# Patient Record
Sex: FEMALE | Race: WHITE | NOT HISPANIC OR LATINO | URBAN - METROPOLITAN AREA
[De-identification: names, ages, dates, MRNs, and addresses within clinical notes are randomized per-mention and may not be internally consistent; named-entity substitution may affect disease eponyms.]

---

## 2017-07-18 ENCOUNTER — TELEPHONE (OUTPATIENT)
Dept: OTHER | Facility: OUTPATIENT CENTER | Age: 52
End: 2017-07-18

## 2017-07-18 NOTE — TELEPHONE ENCOUNTER
Telephone Intake  Date: 2017  Client Name:  Jackie Kaplan  Preferred Name:    MRN: [id number]  : 1965       Age:  52  Caller Name: Self  Relationship to Client:      Presenting Problem / Reason for Assessment   (Clinical History &Symptoms):                 Menopausal Issues - Low Labido - Vaginal Dryness/Thinning      Length of time experiencing symptoms:  ?      Seen Other Providers for Gender (if so, where):    M.D/other.(hormones) :    [facility]                                              Dr. Skelton - Has tried vaginal creams in the past and found that they give her yeast infections  --If yes, request records from the provider at the 1st session.    Therapist:  Claudy Vega  --if yes, request a referral or summary letter from the therapist at the 1st session..    Psychiatrist:  GADIEL  --if yes,, request records from the provider at the 1st session..      Other Medical/Mental Health Diagnoses:                       Menopause                     Herpes                     Hypothyriod          Medications:  Levothyroxine                             Lorazepam           Primary Care Provider: Dr. Skelton                    --If multiple medical conditions, request recent records from primary doctor at the 1st session..      Referral Source:  Joan Vega      Follow Up:        Please Verify Registration    If patient has [employer] or [employer], send to .     Prep Welcome Packet and have patient come half hour beforehand to fill out forms in packet (health history form, transgender history, Safety Screening, PHQ9, and MIKE-7.                    Paperwork Sent                                                                                                                                                                                                                                       Appt

## 2017-07-27 ENCOUNTER — OFFICE VISIT (OUTPATIENT)
Dept: OTHER | Facility: OUTPATIENT CENTER | Age: 52
End: 2017-07-27

## 2017-07-27 DIAGNOSIS — F12.90 MARIJUANA USE: ICD-10-CM

## 2017-07-27 DIAGNOSIS — N94.10 DYSPAREUNIA, FEMALE: Primary | ICD-10-CM

## 2017-07-27 DIAGNOSIS — F41.1 GENERALIZED ANXIETY DISORDER: ICD-10-CM

## 2017-07-27 DIAGNOSIS — B00.9 HSV (HERPES SIMPLEX VIRUS) INFECTION: ICD-10-CM

## 2017-07-27 ASSESSMENT — ANXIETY QUESTIONNAIRES
7. FEELING AFRAID AS IF SOMETHING AWFUL MIGHT HAPPEN: NEARLY EVERY DAY
6. BECOMING EASILY ANNOYED OR IRRITABLE: NEARLY EVERY DAY
3. WORRYING TOO MUCH ABOUT DIFFERENT THINGS: NOT AT ALL
5. BEING SO RESTLESS THAT IT IS HARD TO SIT STILL: SEVERAL DAYS
GAD7 TOTAL SCORE: 14
1. FEELING NERVOUS, ANXIOUS, OR ON EDGE: MORE THAN HALF THE DAYS
2. NOT BEING ABLE TO STOP OR CONTROL WORRYING: NEARLY EVERY DAY

## 2017-07-27 ASSESSMENT — PATIENT HEALTH QUESTIONNAIRE - PHQ9: 5. POOR APPETITE OR OVEREATING: MORE THAN HALF THE DAYS

## 2017-07-27 NOTE — MR AVS SNAPSHOT
After Visit Summary   2017    Jackie Kaplan    MRN: [id number]           Patient Information     Date Of Birth          1965        Visit Information        Provider Department      2017 3:00 PM Jerry Gaspar MD [facility]        Today's Diagnoses     Dyspareunia, female    -  1    HSV (herpes simplex virus) infection        Generalized anxiety disorder        Marijuana use           Follow-ups after your visit        Follow-up notes from your care team     Return if symptoms worsen or fail to improve.      Who to contact     Please call your clinic at [phone] to:    Ask questions about your health    Make or cancel appointments    Discuss your medicines    Learn about your test results    Speak to your doctor   If you have compliments or concerns about an experience at your clinic, or if you wish to file a complaint, please contact [facility] Patient Relations at [phone] or email us at [email or web address]         Additional Information About Your Visit        MyChart Information     [employer] is an electronic gateway that provides easy, online access to your medical records. With [employer], you can request a clinic appointment, read your test results, renew a prescription or communicate with your care team.     To sign up for [employer] visit the website at [email or web address]   You will be asked to enter the access code listed below, as well as some personal information. Please follow the directions to create your username and password.     Your access code is: [email or web address]  Expires: 2017  3:05 PM     Your access code will  in 90 days. If you need help or a new code, please contact your [facility] or call [phone] for assistance.        Care EveryWhere ID     This is your Care EveryWhere ID. This could be used by other organizations to access your [facility]  records  [id number]         Blood Pressure from Last 3 Encounters:   No data found for BP    Weight from Last 3 Encounters:   No data found for Wt              Today, you had the following     No orders found for display       Primary Care Provider    None Specified       No primary provider on file.        Equal Access to Services     RUBEN RUIZ : Hadtalisha gianni ramirez andryo Sojose mali, wagiuliada luqadaha, qajohannta kaalmada adeluis, elizabeth aracelisin hayaadaniel yijass jordan ronal . So [facility] [phone].    ATENCIÓN: Si habla español, tiene a mcdaniel disposición servicios gratuitos de asistencia lingüística. Llame al [phone].    We comply with applicable federal civil rights laws and [place] laws. We do not discriminate on the basis of race, color, national origin, age, disability sex, sexual orientation or gender identity.            Thank you!     Thank you for choosing [facility]  for your care. Our goal is always to provide you with excellent care. Hearing back from our patients is one way we can continue to improve our services. Please take a few minutes to complete the written survey that you may receive in the mail after your visit with us. Thank you!             Your Updated Medication List - Protect others around you: Learn how to safely use, store and throw away your medicines at www.disposemymeds.org.      Notice  As of 7/27/2017 11:59 PM    You have not been prescribed any medications.

## 2017-07-28 ASSESSMENT — ANXIETY QUESTIONNAIRES: GAD7 TOTAL SCORE: 14

## 2017-07-28 ASSESSMENT — PATIENT HEALTH QUESTIONNAIRE - PHQ9: SUM OF ALL RESPONSES TO PHQ QUESTIONS 1-9: 10

## 2017-08-07 NOTE — PROGRESS NOTES
Sexual Medicine consultation at the request of Dr. Claudy Vega from [facility].      ID:  A 52-year-old cis gender woman.      CHIEF CONCERN:  Dyspareunia x2 years and herpes x4 years.      HISTORY OF PRESENT ILLNESS:  The patient had no sexual concerns 2 years ago, until she discovered that her  was unfaithful to her during their marriage.  He was diagnosed with bipolar and some form of schizoaffective or schizophrenic disorder.  They have not had a healthy sexual relationship for over 10 years.  She does not know when exactly her pain with sexual activity started.  In addition, she had a hysterectomy for fibroids in 2012. One ovary was left. Prior to 2012 her menstrual periods were heavy but regular.  Her hot flashes and night sweats started approximately 4 years ago.  Her last pelvic exam was 4 years ago and was not painful.  She denies any significant vaginal dryness related to sex.  She very rarely masturbates.      For the last 10 years, she has only been having sexual activity with her  approximately once a year.  She did not discover any painful sexual activity until about 2 years ago. She and her  engage in mainly penile vaginal intercourse. When the financial market collapsed, they lost everything, and that is when she found out he was cheating on her due to her outbreak of herpes. They began seeing a therapist and he had told her that he had stopped cheating, but then she found out that he still was engaging in that behavior. At the same time she also had a change in her thyroid medication.     At the onset of symptoms 2 years ago, she felt that their foreplay was adequate.  She had adequate interest and arousal.  No pain with contact with the vulva.  However, at the moment of penetration she would have pain. She describes as burning pins and needles and severe in nature. This continued with full penetration until her  reach orgasm.  They did not use any lubricants. Pain  was worse with thrusting activity, and she noticed some spotting of blood on tissue afterwards. Pain resolved quickly after intercourse stopped. Several months later they were sexual again with intercourse; she experienced the same symptoms. This time they did use lubricant, mainly [place], without benefit.  They have not tried any change in sexual positions.  She saw her primary care provider, who did an exam; found atrophic vaginitis and pt was prescribed an unknown dose of Premarin cream.  She does not remember how often she took it or used it, but only tried for 2 weeks.  She then described having burning discharge; does not remember if she went into a physician for a diagnosis and does not remember how it was treated, although she recalls it being a yeast infection.  She then stopped going on for any further intervention because she no longer wanted to have sex with her  anyway.     She is presenting here because they are trying to rescue their relationship and she thinks that maybe she might want to have sex again with him some someday, but they are currently no longer sexually active with each other.  She denies any vulvar pain with sitting, or wearing tight clothing.  She does not engage in any other penetrative activities.  She does not engage in any oral sex.      She describes having intact libido with sexual thoughts, feelings and fantasies.  She describes having subjective arousal, a sense of pleasure with sexual activity. She has fair nongenital objective arousal.  She is able to get some genital arousal objectively in terms of genital congestion and some lubrication, although it takes significantly longer.  She is able to reach orgasm, although it takes longer.  She denies any pain with non-internal masturbation.  She continues to have hot flashes and night sweats, worse at night, and slowly improving over time.       Current medical problems include:  1. Probable herpes    She describes  "approximately 4 years ago having episode of vulvar pain and blister-like lesions on her vulva, which she went and showed her .  Her  then told her that he had herpes.  The patient never went to the doctor for confirmation or treatment.  She simply went to bed and did not leave for many weeks.  She then thinks she has outbreaks \"several times a month,\" but is not sure-again, she has never received any diagnosis or treatment for this.      2. Hypothyroidism; last TSH was normal.    3. Dyspepsia:  4; Anxiety   She is unable to gain weight due to early satiety and epigastric pain.  She has ongoing anxiety and stress that she treats by smoking marijuana before every meal.  She also treats her anxiety  this way.      MEDICATIONS:  Levothyroxine; Ativan 0.05 mg 3 times a month, at most.      PAST MEDICAL HISTORY:  Hysterectomy for fibroids as noted above.  History of Bell's palsy, history of shingles.  No other hospitalizations or surgeries. Pt does note that she has been medications for anxiety in the past.  She states that Celexa made her homicidal in the past.  She was on Wellbutrin briefly trying to quit smoking but felt more anxious on this.      FAMILY HISTORY:  Mother with obesity, diabetes, cardiac arrhythmia.  Father  of heart disease,  Grandfather with suicide.  Remainder of family history is unremarkable.        SOCIAL HISTORY:  Pt eats a standard diet with a fair amount of dairy.  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  She smoked 1 pack per day from the age of 11 up until 3 years ago-now vapes tobacco regularly.  No other recreational drug use, except marijuana as noted above.  Alcohol twice a year, 1-2 per sitting.  , has adult children.      SEXUAL HISTORY:  4 Partners in the lifetime, all male. History of gonorrhea warts and herpes.  She is unsure whether she has been tested for HIV or hepatitis C.  Is vaccinated for hepatitis B, according to the patient.  Paps have all been normal " in the past, not currently sexually active with a partner.      ROS:  Notable for anxiety, night blindness, trouble swallowing, early satiety.  Weight is now stable.  PHQ-9 is rated at 10 and MIKE score is 14.      PHYSICAL EXAMINATION:   GENERAL:  The patient is alert and in no apparent distress.  ht 5' 4-3/4 inches, weight 119.8, blood pressure 114/66, pulse 67.  Speech regular rate and rhythm, mood appears depressed and quite anxious.  She is tearful on and off during the interview.  Some mild psychomotor agitation.  Thought processes appropriate.  Pupils equal and reactive to light.   HEART:  S1S2, no murmurs.   LUNGS:  Clear to auscultation bilaterally. genital exam:  hair in the normal female distribution. external genitalia were consistent with normal female anatomy.  There are minimal atrophic changes to the vulva with mild thinning of the labia majora and minora; some clear secretions at the vaginal introitus, with some mild loss of rugae and mild paleness to the distal vaginal tissues Q-tip test is negative.  Pt is able to tolerate a 2 finger bimanual exam, but pressure with the exam reproduces her pain with penetration anteriorly with some stretching sensation during penetration.      ASSESSMENT:   1.  Dyspareunia. Discussed the multifactorial nature of patient's dyspareunia, including elements of atrophic vaginitis and vaginismus, and overall lack of penetrative activity in the context of a highly dysfunctional emotional and sexual relationship.  Pt has had very little penetrative sexual activity with partner over the course of 10 years and is in a difficult emotional relationship. It is particularly unclear whether patient actually desires sexual activity with her partner at this time. Would recommend and discussed with pt that it would be difficult to tell if any interventions would be working if she is not in a position to either  them using masturbation or other penetration techniques, or with a  supportive partner.  Recommend continuing therapy and self-exploration to discuss whether interventions at this time would be beneficial. Discussed the options for these interventions including topical estrogen therapy and pelvic PT when she and her partner are having a sexual relationship.     2.  Probable herpes. Counseled pt regarding the nature and treatment of herpes infection, including treatment options such as continuous antiretroviral suppression versus intermittent treatment.     3.  Anxiety disorder.  Discussed treatment options including therapy only versus therapy plus medications. Options given the patient's ongoing dyspepsia and difficulties with weight include Remeron and BuSpar. Other options which might be less appropriate for the patient include Prozac and Effexor.  The patient to discuss these with her therapist as well as consider them in the future.  Strongly discouraged the patient from using marijuana as her main treatment option.     Pt to return as needed       A total of 60 min was spent with the patient, of which greater than 50% was spent counseling regarding the issues documented above

## 2018-05-14 ENCOUNTER — RECORDS - HEALTHEAST (OUTPATIENT)
Dept: LAB | Facility: CLINIC | Age: 53
End: 2018-05-14

## 2018-05-14 LAB
ALBUMIN SERPL-MCNC: 3.9 G/DL (ref 3.5–5)
ALP SERPL-CCNC: 68 U/L (ref 45–120)
ALT SERPL W P-5'-P-CCNC: 15 U/L (ref 0–45)
ANION GAP SERPL CALCULATED.3IONS-SCNC: 9 MMOL/L (ref 5–18)
AST SERPL W P-5'-P-CCNC: 24 U/L (ref 0–40)
BILIRUB SERPL-MCNC: 0.2 MG/DL (ref 0–1)
BUN SERPL-MCNC: 14 MG/DL (ref 8–22)
CALCIUM SERPL-MCNC: 9.7 MG/DL (ref 8.5–10.5)
CHLORIDE BLD-SCNC: 103 MMOL/L (ref 98–107)
CO2 SERPL-SCNC: 27 MMOL/L (ref 22–31)
CREAT SERPL-MCNC: 0.75 MG/DL (ref 0.6–1.1)
GFR SERPL CREATININE-BSD FRML MDRD: >60 ML/MIN/1.73M2
GLUCOSE BLD-MCNC: 85 MG/DL (ref 70–125)
POTASSIUM BLD-SCNC: 4.3 MMOL/L (ref 3.5–5)
PROT SERPL-MCNC: 6.6 G/DL (ref 6–8)
SODIUM SERPL-SCNC: 139 MMOL/L (ref 136–145)
TSH SERPL DL<=0.005 MIU/L-ACNC: 2.47 UIU/ML (ref 0.3–5)

## 2018-11-27 ENCOUNTER — RECORDS - HEALTHEAST (OUTPATIENT)
Dept: LAB | Facility: CLINIC | Age: 53
End: 2018-11-27

## 2018-11-27 LAB
ALBUMIN SERPL-MCNC: 3.8 G/DL (ref 3.5–5)
ALP SERPL-CCNC: 58 U/L (ref 45–120)
ALT SERPL W P-5'-P-CCNC: 10 U/L (ref 0–45)
AMYLASE SERPL-CCNC: 74 U/L (ref 5–120)
ANION GAP SERPL CALCULATED.3IONS-SCNC: 6 MMOL/L (ref 5–18)
AST SERPL W P-5'-P-CCNC: 20 U/L (ref 0–40)
BILIRUB SERPL-MCNC: 0.3 MG/DL (ref 0–1)
BUN SERPL-MCNC: 19 MG/DL (ref 8–22)
CALCIUM SERPL-MCNC: 9.5 MG/DL (ref 8.5–10.5)
CHLORIDE BLD-SCNC: 107 MMOL/L (ref 98–107)
CO2 SERPL-SCNC: 30 MMOL/L (ref 22–31)
CREAT SERPL-MCNC: 0.85 MG/DL (ref 0.6–1.1)
GFR SERPL CREATININE-BSD FRML MDRD: >60 ML/MIN/1.73M2
GLUCOSE BLD-MCNC: 100 MG/DL (ref 70–125)
LIPASE SERPL-CCNC: 21 U/L (ref 0–52)
POTASSIUM BLD-SCNC: 4.1 MMOL/L (ref 3.5–5)
PROT SERPL-MCNC: 6.1 G/DL (ref 6–8)
SODIUM SERPL-SCNC: 143 MMOL/L (ref 136–145)

## 2020-05-11 ENCOUNTER — RECORDS - HEALTHEAST (OUTPATIENT)
Dept: LAB | Facility: CLINIC | Age: 55
End: 2020-05-11

## 2020-05-11 LAB — TSH SERPL DL<=0.005 MIU/L-ACNC: 3.37 UIU/ML (ref 0.3–5)

## 2021-05-24 ENCOUNTER — RECORDS - HEALTHEAST (OUTPATIENT)
Dept: ADMINISTRATIVE | Facility: CLINIC | Age: 56
End: 2021-05-24

## 2021-05-28 ENCOUNTER — RECORDS - HEALTHEAST (OUTPATIENT)
Dept: LAB | Facility: CLINIC | Age: 56
End: 2021-05-28

## 2021-05-28 LAB
CHOLEST SERPL-MCNC: 283 MG/DL
FASTING STATUS PATIENT QL REPORTED: ABNORMAL
HCV AB SERPL QL IA: NEGATIVE
HDLC SERPL-MCNC: 65 MG/DL
LDLC SERPL CALC-MCNC: 194 MG/DL
TRIGL SERPL-MCNC: 121 MG/DL
TSH SERPL DL<=0.005 MIU/L-ACNC: 1.84 UIU/ML (ref 0.3–5)

## 2022-05-16 ENCOUNTER — LAB REQUISITION (OUTPATIENT)
Dept: LAB | Facility: CLINIC | Age: 57
End: 2022-05-16
Payer: COMMERCIAL

## 2022-05-16 DIAGNOSIS — E03.9 HYPOTHYROIDISM, UNSPECIFIED: ICD-10-CM

## 2022-05-16 LAB — TSH SERPL DL<=0.005 MIU/L-ACNC: 1.03 UIU/ML (ref 0.3–5)

## 2022-05-16 PROCEDURE — 84443 ASSAY THYROID STIM HORMONE: CPT | Mod: ORL | Performed by: FAMILY MEDICINE

## 2023-05-23 ENCOUNTER — LAB REQUISITION (OUTPATIENT)
Dept: LAB | Facility: CLINIC | Age: 58
End: 2023-05-23
Payer: COMMERCIAL

## 2023-05-23 DIAGNOSIS — N95.1 MENOPAUSAL AND FEMALE CLIMACTERIC STATES: ICD-10-CM

## 2023-05-23 DIAGNOSIS — E03.9 HYPOTHYROIDISM, UNSPECIFIED: ICD-10-CM

## 2023-05-23 DIAGNOSIS — Z13.220 ENCOUNTER FOR SCREENING FOR LIPOID DISORDERS: ICD-10-CM

## 2023-05-23 LAB
ANION GAP SERPL CALCULATED.3IONS-SCNC: 12 MMOL/L (ref 7–15)
BUN SERPL-MCNC: 17.5 MG/DL (ref 8–23)
CALCIUM SERPL-MCNC: 8.4 MG/DL (ref 8.6–10)
CHLORIDE SERPL-SCNC: 103 MMOL/L (ref 98–107)
CHOLEST SERPL-MCNC: 209 MG/DL
CREAT SERPL-MCNC: 0.9 MG/DL (ref 0.51–0.95)
DEPRECATED HCO3 PLAS-SCNC: 24 MMOL/L (ref 22–29)
GFR SERPL CREATININE-BSD FRML MDRD: 73 ML/MIN/1.73M2
GLUCOSE SERPL-MCNC: 124 MG/DL (ref 70–99)
HDLC SERPL-MCNC: 70 MG/DL
LDLC SERPL CALC-MCNC: 125 MG/DL
NONHDLC SERPL-MCNC: 139 MG/DL
POTASSIUM SERPL-SCNC: 4.4 MMOL/L (ref 3.4–5.3)
SODIUM SERPL-SCNC: 139 MMOL/L (ref 136–145)
TRIGL SERPL-MCNC: 69 MG/DL
TSH SERPL DL<=0.005 MIU/L-ACNC: 0.06 UIU/ML (ref 0.3–4.2)

## 2023-05-23 PROCEDURE — 84443 ASSAY THYROID STIM HORMONE: CPT | Mod: ORL | Performed by: PHYSICIAN ASSISTANT

## 2023-05-23 PROCEDURE — 80061 LIPID PANEL: CPT | Mod: ORL | Performed by: PHYSICIAN ASSISTANT

## 2023-05-23 PROCEDURE — 80048 BASIC METABOLIC PNL TOTAL CA: CPT | Mod: ORL | Performed by: PHYSICIAN ASSISTANT

## 2023-07-26 ENCOUNTER — LAB REQUISITION (OUTPATIENT)
Dept: LAB | Facility: CLINIC | Age: 58
End: 2023-07-26
Payer: COMMERCIAL

## 2023-07-26 DIAGNOSIS — E03.9 HYPOTHYROIDISM, UNSPECIFIED: ICD-10-CM

## 2023-07-26 LAB
T4 FREE SERPL-MCNC: 1.54 NG/DL (ref 0.9–1.7)
TSH SERPL DL<=0.005 MIU/L-ACNC: 0.25 UIU/ML (ref 0.3–4.2)

## 2023-07-26 PROCEDURE — 84439 ASSAY OF FREE THYROXINE: CPT | Mod: ORL | Performed by: PHYSICIAN ASSISTANT

## 2023-07-26 PROCEDURE — 84443 ASSAY THYROID STIM HORMONE: CPT | Mod: ORL | Performed by: PHYSICIAN ASSISTANT

## 2023-10-10 ENCOUNTER — TELEPHONE (OUTPATIENT)
Dept: BEHAVIORAL HEALTH | Facility: CLINIC | Age: 58
End: 2023-10-10
Payer: COMMERCIAL

## 2023-10-10 NOTE — TELEPHONE ENCOUNTER
"Pt is a(n) adult (18+ out of HS) Seeking as eval for Adult Mental Health DA for evaluation and recommendations..  Appointment scheduled by:  Other  (no cost estimate)  Caller name:  Misbah(spouse)    Caller phone #: [phone]  Legal Guardianship Reviewed?  Yes: spouse would not give contact info for client. States he [place] a [place] [employer] Directive. I trans client to [place].   [facility] Notified?  Yes  Brief reason for appt:  client discharged from [facility] 10/08/23   Per spouse \"she had a break down\" he did not elaborate further.   We do not have heidy on file spouse transferred to [facility] re: [place] [employer] [place].      needed?  NO    Contact information verified/updated: Yes    Turner Pantoja   "

## 2023-10-17 ENCOUNTER — HOSPITAL ENCOUNTER (OUTPATIENT)
Dept: BEHAVIORAL HEALTH | Facility: CLINIC | Age: 58
Discharge: HOME | End: 2023-10-17
Attending: FAMILY MEDICINE | Admitting: FAMILY MEDICINE
Payer: COMMERCIAL

## 2023-10-17 PROCEDURE — 90791 PSYCH DIAGNOSTIC EVALUATION: CPT | Performed by: COUNSELOR

## 2023-10-17 RX ORDER — LEVOTHYROXINE SODIUM 75 UG/1
75 TABLET ORAL DAILY
COMMUNITY

## 2023-10-17 RX ORDER — OLANZAPINE 10 MG/1
10 TABLET ORAL AT BEDTIME
COMMUNITY

## 2023-10-17 ASSESSMENT — COLUMBIA-SUICIDE SEVERITY RATING SCALE - C-SSRS
1. IN THE PAST MONTH, HAVE YOU WISHED YOU WERE DEAD OR WISHED YOU COULD GO TO SLEEP AND NOT WAKE UP?: YES
6. HAVE YOU EVER DONE ANYTHING, STARTED TO DO ANYTHING, OR PREPARED TO DO ANYTHING TO END YOUR LIFE?: NO
1. IN THE PAST MONTH, HAVE YOU WISHED YOU WERE DEAD OR WISHED YOU COULD GO TO SLEEP AND NOT WAKE UP?: YES
2. HAVE YOU ACTUALLY HAD ANY THOUGHTS OF KILLING YOURSELF IN THE PAST MONTH?: NO

## 2023-10-17 ASSESSMENT — ANXIETY QUESTIONNAIRES
7. FEELING AFRAID AS IF SOMETHING AWFUL MIGHT HAPPEN: NOT AT ALL
IF YOU CHECKED OFF ANY PROBLEMS ON THIS QUESTIONNAIRE, HOW DIFFICULT HAVE THESE PROBLEMS MADE IT FOR YOU TO DO YOUR WORK, TAKE CARE OF THINGS AT HOME, OR GET ALONG WITH OTHER PEOPLE: VERY DIFFICULT
3. WORRYING TOO MUCH ABOUT DIFFERENT THINGS: NEARLY EVERY DAY
GAD7 TOTAL SCORE: 7
4. TROUBLE RELAXING: SEVERAL DAYS
GAD7 TOTAL SCORE: 7
6. BECOMING EASILY ANNOYED OR IRRITABLE: NOT AT ALL
5. BEING SO RESTLESS THAT IT IS HARD TO SIT STILL: SEVERAL DAYS
2. NOT BEING ABLE TO STOP OR CONTROL WORRYING: NOT AT ALL
1. FEELING NERVOUS, ANXIOUS, OR ON EDGE: MORE THAN HALF THE DAYS

## 2023-10-17 ASSESSMENT — PAIN SCALES - GENERAL: PAINLEVEL: NO PAIN (0)

## 2023-10-17 ASSESSMENT — PATIENT HEALTH QUESTIONNAIRE - PHQ9: SUM OF ALL RESPONSES TO PHQ QUESTIONS 1-9: 14

## 2023-10-17 NOTE — PROGRESS NOTES
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 well-being. Provider gave patient printed information about the effects of chemical use on her health and well being. Recommendations:  none .     8. Records:   These were reviewed at time of assessment.   Information in this assessment was obtained from the medical record and  provided by patient and   who is a fair historian.    Patient will have open access to their mental health medical record.    9.   Interactive Complexity: No    10. Safety Plan:  When the patient identifies the following:  Wish to die    The following is recommended:   Complete/Review/Update Safety Plan    Safety Plan:  Adult Long Safety Plan:     [facility]                                       Jackie Kaplan     SAFETY PLAN:  Step 1: Warning signs / cues (Thoughts, images, mood, situation, behavior) that a crisis may be developing:  Thoughts: \"I just want this to end\"  Images: obsessive thoughts of death or dying: ideation  Thinking Processes: ruminations (can't stop thinking about my problems):    Mood: worsening depression and intense worry  Behaviors: " "isolating/withdrawing  and not sleeping enough  Situations: changes in symptoms: anxiety and psychosis    Step 2: Coping strategies - Things I can do to take my mind off of my problems without contacting another person (relaxation technique, physical activity):  Distress Tolerance Strategies:  arts and crafts: coloring, listen to positive and upbeat music: play music , and dance  Physical Activities: go for a walk  Focus on helpful thoughts:  \"I will get through this\" and \"It always passes\"  Step 3: People and social settings that provide distraction:   Name: , friends, cousin, therapist Phone: GADIEL     coffee shop, park, community center, and work   Step 4: Remind myself of people and things that are important to me and worth living for:  my kid      Step 5: When I am in crisis, I can ask these people to help me use my safety plan:   Name: Merlin ()  Phone: GADIEL     Step 6: Making the environment safe:   be around others  Step 7: Professionals or agencies I can contact during a crisis:  Suicide Prevention Lifeline: Call or Text 618   Local Crisis Services: [facility] Mobile Crisis  [phone] (adults)  [phone] (children)      Call 911 or go to my nearest emergency department.   I helped develop this safety plan and agree to use it when needed.  I have been given a copy of this plan.      Client signature _________________________________________________________________  Today s date:  10/17/2023  Completed by Provider Name/ Credentials:  YARED Davison/SERENA  October 17, 2023  Adapted from Safety Plan Template 2008 Rosalie Vargas and Turner العراقي is reprinted with the express permission of the authors.  No portion of the Safety Plan Template may be reproduced without the express, written permission.  You can contact the authors at [email or web address] or [email or web address].        Provider Name/ Credentials:  YARED Davison,  SERENA  Dual   Phone: [phone]  Fax: " [phone]     October 17, 2023

## 2023-10-17 NOTE — PROGRESS NOTES
LOCUS Worksheet     Name: Jackie Kaplan MRN: [id number]    : 1965      Gender:  female    PMI:  NA   Provider Name: [facility]    Provider NPI:  [id number]    Actual level of Care Provided:  therapy and psychiatry    Service(s) receiving or referred to:  IOP    Reason for Variance: due to worsening mental health symptoms.      Rating completed by: Francisco Morrissey, ERIKC/CADEC      I. Risk of Harm:   2      Low Risk of Harm    II. Functional Status:   3      Moderate Impairment    III. Co-Morbidity:   2      Minor Co-Morbidity    IV - A. Recovery Environment - Level of Stress:   3      Moderately Stress Environment    IV - B. Recovery Environment - Level of Support:   2      Supportive Environment    V. Treatment and Recovery History:   3      Moderate to Equivocal Response to Treatment and Recovery Management    VI. Engagement and Recovery Project:   3      Limited Engagement and Recovery       18 Composite Score    Level of Care Recommendation:   17 to 19       High Intensity Community Based Services

## 2023-10-18 ENCOUNTER — TELEPHONE (OUTPATIENT)
Dept: BEHAVIORAL HEALTH | Facility: CLINIC | Age: 58
End: 2023-10-18
Payer: COMMERCIAL

## 2023-10-18 NOTE — TELEPHONE ENCOUNTER
Writer contacted patient's spouse per referral notes, explained that current NICK is for emergency contact and agreed to call him back tomorrow morning when he will be with patient.    Alison Vega [facility] on 10/18/2023 at 2:58 PM

## 2023-10-19 ENCOUNTER — TELEPHONE (OUTPATIENT)
Dept: BEHAVIORAL HEALTH | Facility: CLINIC | Age: 58
End: 2023-10-19
Payer: COMMERCIAL

## 2023-10-19 ENCOUNTER — MYC MEDICAL ADVICE (OUTPATIENT)
Dept: BEHAVIORAL HEALTH | Facility: CLINIC | Age: 58
End: 2023-10-19
Payer: COMMERCIAL

## 2023-10-19 ENCOUNTER — BEH TREATMENT PLAN (OUTPATIENT)
Dept: BEHAVIORAL HEALTH | Facility: CLINIC | Age: 58
End: 2023-10-19
Attending: PSYCHIATRY & NEUROLOGY

## 2023-10-19 NOTE — TELEPHONE ENCOUNTER
----- Message from Alison Vega [facility] sent at 10/19/2023  9:26 AM CDT -----  Regarding: Patient starting IOP/DT 2  Adult Mental Health Programmatic Care Schedule Request    Patient Name: Jackie Kaplan  Location of programming: [facility]  Start Date: 10/24/23      Adult Program Group: Adult Program Group: DT/IOP 2 Psychosis Track [[id number]]  Schedule: M, T, Th 12 noon - 3 pm  9 hours per week for 12 weeks  Number of visits to be scheduled: 36 days    Attending Provider (MD):  Dr Teo Vu.  Visit Type:  In-Person    Accommodations Needed: N/A  Alerts Identified/Substantiation: N/A  Consulted with Supervisor: N/A    Send to:   [UR BEH BCA (07259)] ##ONLY if Start Date is determined##  NG 14 OBC's (BEH BEHAVIORAL OUTPATIENT ASSESSMENTS [35623])   Alison Vega ([facility])  Camila Strong (PHP)  Jessica Horton

## 2023-10-19 NOTE — TELEPHONE ENCOUNTER
Writer, patient and patient's  discussed programming; patient will start in IOP/DT 2 on 10/24.    YARED Callaway on 10/19/2023 at 9:26 AM

## 2023-10-20 NOTE — ADDENDUM NOTE
Encounter addended by: Francisco Morrissey, [facility] on: 10/20/2023 3:06 PM   Actions taken: Clinical Note Signed

## 2023-10-23 NOTE — PROGRESS NOTES
"Safety Plan:  Adult Long Safety Plan:      [facility]                                        Jackierosario Kaplan         SAFETY PLAN:  Step 1: Warning signs / cues (Thoughts, images, mood, situation, behavior) that a crisis may be developing:  Thoughts: \"I just want this to end\"  Images: obsessive thoughts of death or dying: ideation  Thinking Processes: ruminations (can't stop thinking about my problems):    Mood: worsening depression and intense worry  Behaviors: isolating/withdrawing  and not sleeping enough  Situations: changes in symptoms: anxiety and psychosis    Step 2: Coping strategies - Things I can do to take my mind off of my problems without contacting another person (relaxation technique, physical activity):  Distress Tolerance Strategies:  arts and crafts: coloring, listen to positive and upbeat music: play music , and dance  Physical Activities: go for a walk  Focus on helpful thoughts:  \"I will get through this\" and \"It always passes\"  Step 3: People and social settings that provide distraction:                 Name: , friends, cousin, therapist            Phone: NA                   coffee shop, park, community center, and work           Step 4: Remind myself of people and things that are important to me and worth living for:  my kid        Step 5: When I am in crisis, I can ask these people to help me use my safety plan:                 Name: Merlin ()              Phone: NA                   Step 6: Making the environment safe:   be around others  Step 7: Professionals or agencies I can contact during a crisis:  Suicide Prevention Lifeline: Call or Text 781   Local Crisis Services: [facility] Mobile Crisis  [phone] (adults)  [phone] (children)        Call 911 or go to my nearest emergency department.       I helped develop this safety plan and agree to use it when needed.  I have been given a copy of this plan.     "   Client signature _________________________________________________________________  Today s date:  10/17/2023  Completed by Provider Name/ Credentials:  Francisco Morrissey, YARED/SERENA                    October 17, 2023
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
## 2023-10-24 ENCOUNTER — HOSPITAL ENCOUNTER (OUTPATIENT)
Dept: BEHAVIORAL HEALTH | Facility: CLINIC | Age: 58
Discharge: HOME | End: 2023-10-24
Attending: PSYCHIATRY & NEUROLOGY
Payer: COMMERCIAL

## 2023-10-24 VITALS
OXYGEN SATURATION: 97 % | BODY MASS INDEX: 18.78 KG/M2 | RESPIRATION RATE: 17 BRPM | WEIGHT: 110 LBS | DIASTOLIC BLOOD PRESSURE: 52 MMHG | SYSTOLIC BLOOD PRESSURE: 92 MMHG | HEART RATE: 76 BPM | TEMPERATURE: 97.8 F | HEIGHT: 64 IN

## 2023-10-24 PROBLEM — F41.1 GENERALIZED ANXIETY DISORDER: Status: ACTIVE | Noted: 2023-10-24

## 2023-10-24 PROCEDURE — 90853 GROUP PSYCHOTHERAPY: CPT | Performed by: PSYCHOLOGIST

## 2023-10-24 PROCEDURE — 90853 GROUP PSYCHOTHERAPY: CPT | Performed by: SOCIAL WORKER

## 2023-10-24 ASSESSMENT — PATIENT HEALTH QUESTIONNAIRE - PHQ9
SUM OF ALL RESPONSES TO PHQ QUESTIONS 1-9: 12
5. POOR APPETITE OR OVEREATING: SEVERAL DAYS

## 2023-10-24 ASSESSMENT — ANXIETY QUESTIONNAIRES
1. FEELING NERVOUS, ANXIOUS, OR ON EDGE: SEVERAL DAYS
7. FEELING AFRAID AS IF SOMETHING AWFUL MIGHT HAPPEN: SEVERAL DAYS
5. BEING SO RESTLESS THAT IT IS HARD TO SIT STILL: NOT AT ALL
GAD7 TOTAL SCORE: 3
3. WORRYING TOO MUCH ABOUT DIFFERENT THINGS: NOT AT ALL
IF YOU CHECKED OFF ANY PROBLEMS ON THIS QUESTIONNAIRE, HOW DIFFICULT HAVE THESE PROBLEMS MADE IT FOR YOU TO DO YOUR WORK, TAKE CARE OF THINGS AT HOME, OR GET ALONG WITH OTHER PEOPLE: SOMEWHAT DIFFICULT
2. NOT BEING ABLE TO STOP OR CONTROL WORRYING: NOT AT ALL
6. BECOMING EASILY ANNOYED OR IRRITABLE: NOT AT ALL
GAD7 TOTAL SCORE: 3

## 2023-10-24 NOTE — GROUP NOTE
Psychoeducation Group Note    PATIENT'S NAME: Jackie Kaplan  MRN:   [id number]  :   1965  ACCT. NUMBER: [id number]  DATE OF SERVICE: 10/24/23  START TIME:  2:00 PM  END TIME:  2:50 PM  FACILITATOR: Carisa Yan LICSW; Zehra Melo OTR  TOPIC: MH Life Skills Group: Lifestyle Balance and Structure  [facility] Adult Mental Health Day Treatment  TRACK: IOP/DT 2     NUMBER OF PARTICIPANTS: 4    Summary of Group / Topics Discussed:  Lifestyle Balance and Strucure:  Time Management: Motivation - Intrinsic and Extrinsic Motivators: Patients were taught and applied skills and strategies to effectively manage their time, energy, and resources.  Patients discussed motivation as it relates to mental health symptoms and shared individual challenges to engaging in and accomplishing meaningful activities of daily living.  Patients were educated on intrinsic and extrinsic motivation and provided with strategies on how to use these motivators to accomplish activities.     Patient Session Goals / Objectives:  Identified how mental health symptoms impact motivation and can lead to difficulty completing activities of daily living    Identified tasks they are having difficulty engaging in and completing and at least one intrinsic and one extrinsic motivator to try to help improve follow through          Patient Participation / Response:  Fully participated with the group by sharing personal reflections / insights and openly received / provided feedback with other participants.    Verbalized understanding of content    Treatment Plan:  Patient has a current master individualized treatment plan.  See Epic treatment plan for more information.    CIERRA Galarza
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
## 2023-10-24 NOTE — PROGRESS NOTES
"RN Review of Medical History / Physical Health Screen  Outpatient Mental Health Programs - [facility] Adult Mental Health Day Treatment    PATIENT'S NAME: Jackie Kaplan  Preferred name: Jackie   58 year old  MRN:   [id number]  :   1965  ACCT. NUMBER: [id number]  CURRENT AGE:  58 year old    DATE OF DIAGNOSTIC ASSESSMENT: 10/17/2023  DATE OF ADMISSION: 10/24/2023     Please see Diagnostic Assessment for additional Medical History.     General Health:   Have you had any exposure to any communicable disease in the past 2-3 weeks? no     Do you have a history of seizures?     If so, do you have a seizure plan? Known triggers?     Notify patient that we will call 911 (if virtual) or a code (if in-person), if we were to witness seizure during group. no    N/A     Nutrition:    Are you on a special diet? If yes, please explain:  no   Do you have any concerns regarding your nutritional status? If yes, please explain:  no   Have you had any appetite changes in the last 3 months?  No     Have you had any weight loss or weight gain in the last 3 months?  No, but struggles to maintain appetite without using cannabis           Height/Weight Review:  Patient reported height:  5'4\"      Patient reports weight:  Date last checked:  110 lb   10/14/23       Patient height and weight recorded by RN in epic flowsheet: No; Unable to measure  Programmatic Care currently provided via telehealth. All pt weights and heights will be collected through patient self-report and recorded in physical health screening progress note upon admission to the program.        Fall Risk:   Have you had any falls in the past 3 months? no     Do you currently use any assistive devices for mobility?   no      Does the patient have medication concerns? Yes, history of abnormal movement when taking psychiatric medications. Patient was unable to recall the name of the medication that caused the abnormal movements.      Was an MTM " referral placed? No      Does the patient have any acute or chronic pain concerns that might impact participation in the program? No         Per completion of the Medical History / Physical Health Screen, is there a recommendation to see / follow up with a primary care physician/clinic or dentist?    Yes, Recommendations:   nutritional risk          Sandy Campbell RN  10/24/2023

## 2023-10-24 NOTE — GROUP NOTE
Psychotherapy Group Note    PATIENT'S NAME: Jackie Kaplan  MRN:   [id number]  :   1965  ACCT. NUMBER: [id number]  DATE OF SERVICE: 10/24/23  START TIME: 12:00 PM  END TIME: 12:50 PM  FACILITATOR: Alka De La Paz Psy.D, LP  TOPIC: MH EBP Group: Self-Awareness  [facility] Mental [facility] Day Treatment  TRACK: iop/dt 2p   6B    NUMBER OF PARTICIPANTS: 8    Summary of Group / Topics Discussed:  Self-Awareness: Grief: Patients were provided with an overview of how personal losses impact their thoughts, feelings, and behaviors. Different stages of grief were discussed, with a focus on the personal and individual experiences of grief as a natural response to loss. The relationship between grief, depression, and anxiety was also discussed. Patients were provided with information regarding different ways of processing grief and shared their personal experiences.     Patient Session Goals / Objectives:  Defined and explored the concept of grief and the grieving process  Discussed relationship between grief, depression, and anxiety   Normalized and recognized the purpose/benefits of the grieving process  Discussed management of the thoughts and feelings associated with grief      Patient Participation / Response:  Fully participated with the group by sharing personal reflections / insights and openly received / provided feedback with other participants.    Demonstrated understanding of values, strengths, and challenges to learn about themselves    Treatment Plan:  Patient has a current master individualized treatment plan.  See Epic treatment plan for more information.    Alka De La Paz Psy.D, ANGUS

## 2023-10-26 ENCOUNTER — HOSPITAL ENCOUNTER (OUTPATIENT)
Dept: BEHAVIORAL HEALTH | Facility: CLINIC | Age: 58
Discharge: HOME | End: 2023-10-26
Attending: PSYCHIATRY & NEUROLOGY
Payer: COMMERCIAL

## 2023-10-26 PROCEDURE — 90853 GROUP PSYCHOTHERAPY: CPT | Performed by: PSYCHOLOGIST

## 2023-10-26 PROCEDURE — 90853 GROUP PSYCHOTHERAPY: CPT | Performed by: SOCIAL WORKER

## 2023-10-26 NOTE — GROUP NOTE
Psychotherapy Group Note    PATIENT'S NAME: Jackie Kaplan  MRN:   [id number]  :   1965  ACCT. NUMBER: [id number]  DATE OF SERVICE: 10/26/23  START TIME: 12:00 PM  END TIME: 12:50 PM  FACILITATOR: Alka De La Paz Psy.D, LP  TOPIC: MH EBP Group: Coping Skills  [facility] Adult Mental Health Day Treatment  TRACK: iop/dt2p 6B    NUMBER OF PARTICIPANTS: 6    Summary of Group / Topics Discussed:  Coping Skills: Building Positive Experiences: Patients discussed the importance of planning and engaging in positive experiences, as strategies to increase positive thinking, hope, and self-worth.  Explored the benefits of planning / creating positive experiences, including recognizing and reducing negativity bias by focusing on and building positive experiences.   Several approaches to building positive experiences were presented and discussed relevant to each patient.      Patient Session Goals / Objectives:  Understand the purpose of planning / creating / participating / sharing in positive experiences.  Explore patient s experiences related to negative thinking and how it influences activities and moodIdentify current positive events in patient s life.   Set goals to increase a variety of positive experiences.  Address barriers to planning / engaging in positive experiences.      Patient Participation / Response:  Fully participated with the group by sharing personal reflections / insights and openly received / provided feedback with other participants.    Expressed understanding of the relevance / importance of coping skills at distressing times in life    Treatment Plan:  Patient has a current master individualized treatment plan.  See Epic treatment plan for more information.    Alka De La Paz Psy.D, ANGUS

## 2023-10-26 NOTE — GROUP NOTE
"Process Group Note    PATIENT'S NAME: Jackie Kaplan  MRN:   [id number]  :   1965  ACCT. NUMBER: [id number]  DATE OF SERVICE: 10/26/23  START TIME:  1:00 PM  END TIME:  1:50 PM  FACILITATOR: Alka De La Paz Psy.D, LP  TOPIC:  Process Group    Diagnoses:  300.02 (F41.1) Generalized Anxiety Disorder.  296.32 (F33.1) Major Depressive Disorder, Recurrent Episode, Moderate _ and With anxious distress  309.9 (F43.9) Unspecified Trauma and Stressor Related Disorder.  5. 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       [facility]   PSYCHIATRY DISCHARGE SUMMARY  Admission Date and Time: 10/6/2023 3:11 AM   Discharge Date: 10/8/2023  Discharge Diagnoses   # 1: Bipolar I, manic with psychotic features   # 2: Cannabis use disorder, moderate     Per her report  PTSD                Psychiatry: [facility]        [address]  [place] [address]  Phone: [phone]          Individual Therapist/Name:  [facility] ([facility])  PCP: Mary Carbajal [facility] Day Treatment  TRACK: iop/dt2p  6B    NUMBER OF PARTICIPANTS: 6        Data:    Session content: At the start of this group, patients were invited to check in by identifying themselves, describing their current emotional status, and identifying issues to address in this group.   Area(s) of treatment focus addressed in this session included Symptom Management, Personal Safety, and Community Resources/Discharge Planning.  Client reported being safe today.  Reported mood is \"better.\" Goal for today is to attend group therapy. The client talked to the group about how he thinks a great deal and intellectualizes situations. He reported that he thinks about his message and where conversations will go, so he can put his \"thoughts in other people's minds.\" He reported that " "He wants to hear other people's perspectives and understand them. He stated that he enjoys the groups and meeting other people. He reported being safe, no cd use, or self-injurious behaviors. He reported that he \"had one drop of alcohol,\" otherwise is sober. He talked about being in a safe environment and feeling support from the group.  He talked to the group about hearing special messages.    Therapeutic Interventions/Treatment Strategies:  Psychotherapist reinforced use of skills. Treatment modalities used include Cognitive Behavioral Therapy and Dialectical Behavioral Therapy. Interventions include Coping Skills: Assisted patient in identifying 1-2 healthy distraction skills to reduce overall distress, Discussed how the use of intentional \"in the moment\" actions can help reduce distress, and Reviewed patients current calming practices and discussed a more formal way of practicing and accessing skills, Relapse Prevention: Facilitated understanding of effective coping skills in response to triggers for substance use, Mindfulness: Facilitated discussion of when/how to use mindfulness skills to benefit general health, mental health symptoms, and stressors, and Symptoms Management: Promoted understanding of their diagnoses and how it impacts their functioning.    Assessment:    Patient response:   Patient responded to session by giving feedback, focusing on goals, and being attentive    Possible barriers to participation / learning include: severity of symptoms    Health Issues:   None reported       Substance Use Review:   Substance Use: Last use: \"one drop of alcohol\"    Mental Status/Behavioral Observations  Appearance:   Appropriate   Eye Contact:   Good   Psychomotor Behavior: Normal   Attitude:   Cooperative   Orientation:   All  Speech   Rate / Production: Normal    Volume:  Normal   Mood:    Anxious  Depressed   Affect:    Constricted   Thought Content:   Rumination and Psychosis reports preservative thoughts " and referential thinking  Thought Form:  Coherent  Logical  Psychosis    Insight:    Good     Plan:   Safety Plan: Recommended that patient call 911 or go to the local ED should there be a change in any of these risk factors.   Barriers to treatment: None identified  Patient Contracts (see media tab):  None  Substance Use: Provided encouragement towards sobriety   Continue or Discharge: Patient will continue in Adult Day Treatment (ADT)  as planned. Patient is likely to benefit from learning and using skills as they work toward the goals identified in their treatment plan.      Jimmy Morocho.MIKIE, LP  October 26, 2023

## 2023-10-26 NOTE — GROUP NOTE
Psychoeducation Group Note    PATIENT'S NAME: Jackie Kaplan  MRN:   [id number]  :   1965  ACCT. NUMBER: [id number]  DATE OF SERVICE: 10/26/23  START TIME:  2:00 PM  END TIME:  2:50 PM  FACILITATOR: Carisa Yan LICSW; Genna Patrick OTR  TOPIC: MH Life Skills Group: Resiliency Development  [facility] Adult Mental Health Day Treatment  TRACK: IOP/DT 2 (6B)    NUMBER OF PARTICIPANTS: 6    Summary of Group / Topics Discussed:  Resiliency Development:  A, B, Cs of Coping Skills: Patients were taught how to identify stressors, signs of stress, coping skills, and prevention strategies for overall stress management.  Patients were given the opportunity to identify both ongoing and acute mental health symptoms and how to effectively manage these symptoms by developing an effective aftercare plan.  Patients increased awareness of community based resources.    Patient Session Goals / Objectives:  Identified how using coping skills can be used for symptom and stress management     Improved awareness of individualed symptoms and stressors and how to effectively cope   Established a relapse prevention plan to practice these skills in their own environments  Practiced and reflected on how to generalize taught skills to their everyday life      Patient Participation / Response:  Fully participated with the group by sharing personal reflections / insights and openly received / provided feedback with other participants.    Verbalized understanding of content    Treatment Plan:  Patient has a current master individualized treatment plan.  See Epic treatment plan for more information.    CIERRA Galarza

## 2023-10-30 ENCOUNTER — HOSPITAL ENCOUNTER (OUTPATIENT)
Dept: BEHAVIORAL HEALTH | Facility: CLINIC | Age: 58
Discharge: HOME | End: 2023-10-30
Attending: PSYCHIATRY & NEUROLOGY
Payer: COMMERCIAL

## 2023-10-30 PROCEDURE — 90853 GROUP PSYCHOTHERAPY: CPT | Performed by: SOCIAL WORKER

## 2023-10-30 PROCEDURE — 90853 GROUP PSYCHOTHERAPY: CPT | Performed by: PSYCHOLOGIST

## 2023-10-30 NOTE — GROUP NOTE
Psychoeducation Group Note    PATIENT'S NAME: Jackie Kaplan  MRN:   [id number]  :   1965  ACCT. NUMBER: [id number]  DATE OF SERVICE: 10/30/23  START TIME:  2:00 PM  END TIME:  2:50 PM  FACILITATOR: Carisa Yan LICSW; Nella Ontiveros RN  TOPIC: MH Wellness Group: Health Maintenance  [facility] Mental [facility] Day Treatment  TRACK: IOP/DT 2 (6B)    NUMBER OF PARTICIPANTS: 6    Summary of Group / Topics Discussed:  Health Maintenance: Eight Dimensions of Wellness: The concept of holistic health through the model of eight dimensions was introduced. Group members participated in identifying behaviors and activities in each of the dimensions of wellness.  The importance of each dimension was reinforced and the concept of balance in life as it relates to wellness was explored.      Patient Session Goals / Objectives:  Verbalized understanding of balance in wellness and how it relates to their life  Identified and explained the eight dimensions of wellness  Categorized activities and wellness needs into corresponding dimensions appropriately during exercise        Patient Participation / Response:  Fully participated with the group by sharing personal reflections / insights and openly received / provided feedback with other participants.    Demonstrated understanding of topics discussed through group discussion and participation and Verbalized understanding of health maintenance topic    Treatment Plan:  Patient has a current master individualized treatment plan.  See Epic treatment plan for more information.    CIERRA Galarza

## 2023-10-30 NOTE — GROUP NOTE
Psychotherapy Group Note    PATIENT'S NAME: Jackie Kaplan  MRN:   [id number]  :   1965  ACCT. NUMBER: [id number]  DATE OF SERVICE: 10/30/23  START TIME: 12:00 PM  END TIME: 12:50 PM  FACILITATOR: Alka De La Paz Psy.D, LP  TOPIC:  EBP Group: Self-Awareness  [facility] Adult Mental Health Day Treatment  TRACK: iop/dt2p  6B    NUMBER OF PARTICIPANTS: 6    Summary of Group / Topics Discussed:  Self-Awareness: Gratitude: Topic focused on assisting patients in identifying key concepts in gratitude. Patients discussed the benefits of practicing gratitude and its impact on mood improvement, mindfulness, and perspective. Patients worked to increase time spent on recognition and appreciation of what is positive and working in their lives. Patients discussed the concepts and benefits of feeling grateful. The goal is to reduce rumination and negative thinking resulting in increased mindfulness and resilience. Patients specifically discussed how they can practice and problem solve barriers to daily gratitude practice.     Patient Session Goals / Objectives:  [place] the concept and benefits of gratitude  Identified ways to practice gratitude in daily life  Problem solved barriers to practicing gratitude      Patient Participation / Response:  Fully participated with the group by sharing personal reflections / insights and openly received / provided feedback with other participants.    Demonstrated understanding of values, strengths, and challenges to learn about themselves    Treatment Plan:  Patient has a current master individualized treatment plan.  See Epic treatment plan for more information.    Alka De La Paz Psy.D, LP

## 2023-10-30 NOTE — GROUP NOTE
"Process Group Note    PATIENT'S NAME: Jackie Kaplan  MRN:   [id number]  :   1965  ACCT. NUMBER: [id number]  DATE OF SERVICE: 10/30/23  START TIME:  1:00 PM  END TIME:  1:50 PM  FACILITATOR: Alka De La Paz Psy.D, LP  TOPIC:  Process Group    Diagnoses:  300.02 (F41.1) Generalized Anxiety Disorder.  296.32 (F33.1) Major Depressive Disorder, Recurrent Episode, Moderate _ and With anxious distress  309.9 (F43.9) Unspecified Trauma and Stressor Related Disorder.  5. 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       Per [facility]   PSYCHIATRY DISCHARGE SUMMARY  Admission Date and Time: 10/6/2023 3:11 AM   Discharge Date: 10/8/2023  Discharge Diagnoses   # 1: Bipolar I, manic with psychotic features   # 2: Cannabis use disorder, moderate     Per her report  PTSD                Psychiatry: [facility]        [address]  [place] [address]  Phone: [phone]          Individual Therapist/Name:  [facility] ([facility])  PCP: Mary Carbaj[facility] Mental [facility] Day Treatment  TRACK: iop/dt2p  6B    NUMBER OF PARTICIPANTS: 6        Data:    Session content: At the start of this group, patients were invited to check in by identifying themselves, describing their current emotional status, and identifying issues to address in this group.   Area(s) of treatment focus addressed in this session included Symptom Management, Personal Safety, and Community Resources/Discharge Planning.  Client reported being safe today.  Reported mood is \"good.\"  Goal for today is to attend group therapy.  The client talked to the group about how she stopped isolating from others and went to her psychiatry appointment. She reported that her goal was to stay grounded and present. She reported problems with anxiety, no psychosis, and that she uses " "marijuana to calm her stomach. She reported that she will try to use gummy marijuana and check to get medical marijuana.       Therapeutic Interventions/Treatment Strategies:  Psychotherapist reinforced use of skills. Treatment modalities used include Cognitive Behavioral Therapy and Dialectical Behavioral Therapy. Interventions include Coping Skills: Discussed how the use of intentional \"in the moment\" actions can help reduce distress, Relapse Prevention: Facilitated understanding of effective coping skills in response to triggers for substance use, Mindfulness: Encouraged a plan to use mindfulness skills in daily life, and Symptoms Management: Promoted understanding of their diagnoses and how it impacts their functioning.    Assessment:    Patient response:   Patient responded to session by listening, focusing on goals, and being attentive    Possible barriers to participation / learning include: severity of symptoms    Health Issues:   None reported       Substance Use Review:   Substance Use: Last use: gummy marijuana for her stomach     Mental Status/Behavioral Observations  Appearance:   Appropriate   Eye Contact:   Good   Psychomotor Behavior: Normal   Attitude:   Cooperative   Orientation:   All  Speech   Rate / Production: Normal    Volume:  Normal   Mood:    Anxious  Depressed   Affect:    Constricted   Thought Content:   Rumination and Psychosis denies any symptoms of psychosis  Thought Form:  Coherent  Logical     Insight:    Good     Plan:   Safety Plan: Recommended that patient call 911 or go to the local ED should there be a change in any of these risk factors.   Barriers to treatment: None identified  Patient Contracts (see media tab):  None  Substance Use: Not addressed in session   Continue or Discharge: Patient will continue in Adult Day Treatment (ADT)  as planned. Patient is likely to benefit from learning and using skills as they work toward the goals identified in their treatment " plan.      Alka De La Paz Psy.D, LP  October 30, 2023

## 2023-10-31 ENCOUNTER — HOSPITAL ENCOUNTER (OUTPATIENT)
Dept: BEHAVIORAL HEALTH | Facility: CLINIC | Age: 58
Discharge: HOME | End: 2023-10-31
Attending: PSYCHIATRY & NEUROLOGY
Payer: COMMERCIAL

## 2023-10-31 PROCEDURE — 90853 GROUP PSYCHOTHERAPY: CPT | Performed by: PSYCHOLOGIST

## 2023-10-31 PROCEDURE — 90853 GROUP PSYCHOTHERAPY: CPT | Performed by: SOCIAL WORKER
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
## 2023-10-31 NOTE — GROUP NOTE
Psychotherapy Group Note    PATIENT'S NAME: Jackie Kaplan  MRN:   [id number]  :   1965  ACCT. NUMBER: [id number]  DATE OF SERVICE: 10/31/23  START TIME:  2:00 PM  END TIME:  2:50 PM  FACILITATOR: Carisa Yan LICSW; Genna Patrick OTR  TOPIC: MH EBP Group: Coping Skills  [facility] Mental Health Day Treatment  TRACK: IOP/DT 2 (6B)    NUMBER OF PARTICIPANTS: 6    Summary of Group / Topics Discussed:  Coping Skills: Grounding: Patients discussed and practiced strategies to increase attachment / presence to the current moment.  Patients identified situations in which using these strategies will help improve emotion regulation sense of calm in the body.  Reviewed the benefits of applying grounding strategies, as well as past / current practices of each member.  Patients identified situations in which using these strategies would reduce stress. They developed the ability to distinguish when these strategies can be useful in their lives for management and stress and psychological well-being.    Patient Session Goals / Objectives:  Understand the purpose of using grounding strategies to reduce stress.  Verbalize understanding of how and when to apply grounding strategies to reduce distress and increase presence in the moment.  Practice using various calming strategies (e.g. 5-4-3-2-1; mental and body awareness).  Choose 1-2 grounding strategies to apply during times of distress.      Patient Participation / Response:  Fully participated with the group by sharing personal reflections / insights and openly received / provided feedback with other participants.    Demonstrated understanding of topics discussed through group discussion and participation and Practiced 2-3 new coping skills in session    Treatment Plan:  Patient has a current master individualized treatment plan.  See Epic treatment plan for more information.    CIERRA Galarza

## 2023-10-31 NOTE — GROUP NOTE
Psychotherapy Group Note    PATIENT'S NAME: Jackie Kaplan  MRN:   [id number]  :   1965  ACCT. NUMBER: [id number]  DATE OF SERVICE: 10/31/23  START TIME: 12:00 PM  END TIME: 12:50 PM  FACILITATOR: Alka De La Paz Psy.D, LP  TOPIC: MH EBP Group: Relationship Skills  [facility] Mental Health Day Treatment  TRACK: iop/dt2p  6B    NUMBER OF PARTICIPANTS: 6    Summary of Group / Topics Discussed:  Relationship Skills: Boundaries: Patients were provided with a general overview of interpersonal boundaries and how lack of boundaries relates to symptoms and functioning. The purpose is to help patients identify boundary issues and gain awareness and skills to work towards healthier interpersonal boundaries. Current awareness of healthy boundary characteristics and barriers to establishing healthy boundaries were discussed.    Patient Session Goals / Objectives:  Familiarized patients with the concept of interpersonal boundaries and their characteristics  Discussed and practiced strategies to promote healthier interpersonal boundaries  Identified boundary issues and identified plan to improve boundaries      Patient Participation / Response:  Fully participated with the group by sharing personal reflections / insights and openly received / provided feedback with other participants.    Demonstrated understanding of relationship skills and communication skills    Treatment Plan:  Patient has a current master individualized treatment plan.  See Epic treatment plan for more information.    Alka De La Paz Psy.D, ANGUS

## 2023-11-06 ENCOUNTER — HOSPITAL ENCOUNTER (OUTPATIENT)
Dept: BEHAVIORAL HEALTH | Facility: CLINIC | Age: 58
Discharge: HOME | End: 2023-11-06
Attending: PSYCHIATRY & NEUROLOGY
Payer: COMMERCIAL

## 2023-11-06 PROCEDURE — 90853 GROUP PSYCHOTHERAPY: CPT | Performed by: SOCIAL WORKER

## 2023-11-06 PROCEDURE — 90853 GROUP PSYCHOTHERAPY: CPT | Performed by: PSYCHOLOGIST
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
## 2023-11-06 NOTE — GROUP NOTE
Psychotherapy Group Note    PATIENT'S NAME: Jackie Kaplan  MRN:   [id number]  :   1965  ACCT. NUMBER: [id number]  DATE OF SERVICE: 23  START TIME: 12:00 PM  END TIME: 12:50 PM  FACILITATOR: Alka De La Paz Psy.D, LP  TOPIC:  EBP Group: Self-Awareness  [facility] Adult Mental Health Day Treatment  TRACK: iop/dt2p   6B    NUMBER OF PARTICIPANTS: 5    Summary of Group / Topics Discussed:  Self-Awareness: Personal Strengths: Topic focused on assisting patients in identifying personal strengths and how they relate to the management of mental health symptoms. Patients discussed the benefits of acknowledging their personal strengths and their impact on mood improvement, mindfulness, and perspective. Patients worked to increase time spent on recognition and appreciation of what is positive and working in their lives. The goal is to reduce rumination and negative thinking resulting in increased mindfulness and resilience. Patients will work to put skills into practice and problem-solve barriers.     Patient Session Goals / Objectives:  Identified personal strengths  Identified barriers to recognition of personal strengths  Verbalized understanding of strategies to increase use of their strengths in management of daily symptoms      Patient Participation / Response:  Fully participated with the group by sharing personal reflections / insights and openly received / provided feedback with other participants.    Demonstrated understanding of values, strengths, and challenges to learn about themselves and Identified / Expressed readiness to act intentionally, increase self-compassion, promote personal growth  The group discussed  emotions and coping with difficult symptoms.  They talked about negative thoughts, anhedonia, and about positive thoughts.  Treatment Plan:  Patient has a current master individualized treatment plan.  See Epic treatment plan for more information.    Alka De La Paz Psy.D, ANGUS

## 2023-11-06 NOTE — GROUP NOTE
Psychoeducation Group Note    PATIENT'S NAME: Jackie Kaplan  MRN:   [id number]  :   1965  ACCT. NUMBER: [id number]  DATE OF SERVICE: 23  START TIME:  2:00 PM  END TIME:  2:50 PM  FACILITATOR: Carisa Yan LICSW; Sandy Campbell, RN; Nella Ontiveros RN  TOPIC:  Wellness Group: Mental Health Maintenance  [facility] Adult Mental Health Day Treatment  TRACK: IOP/DT 2    NUMBER OF PARTICIPANTS: 5    Summary of Group / Topics Discussed:  Mental Health Maintenance:  Coping Bingo: Patients were educated on the importance of balance in meeting our wellness needs. Topic of social/coping was reviewed and patients participated in playing a verbal response style coping BINGO game. In this game, patients were challenged to utilize their understanding of themselves and their coping strategies to respond to the questions on their BINGO cards.    Patient Session Goals / Objectives:  Identified the importance of balance in wellness  Explained the important aspects of socialization/effective coping strategies  Listed ways of improving weak areas in social/coping skills      Patient Participation / Response:  Fully participated with the group by sharing personal reflections / insights and openly received / provided feedback with other participants.    Demonstrated understanding of topics discussed through group discussion and participation and Verbalized understanding of mental health maintenance topic    Treatment Plan:  Patient has a current master individualized treatment plan.  See Epic treatment plan for more information.    CIERRA Galarza

## 2023-11-07 ENCOUNTER — HOSPITAL ENCOUNTER (OUTPATIENT)
Dept: BEHAVIORAL HEALTH | Facility: CLINIC | Age: 58
Discharge: HOME | End: 2023-11-07
Attending: PSYCHIATRY & NEUROLOGY
Payer: COMMERCIAL

## 2023-11-07 PROCEDURE — 90853 GROUP PSYCHOTHERAPY: CPT | Performed by: PSYCHOLOGIST

## 2023-11-07 PROCEDURE — 90853 GROUP PSYCHOTHERAPY: CPT | Performed by: SOCIAL WORKER

## 2023-11-07 NOTE — GROUP NOTE
"Process Group Note    PATIENT'S NAME: Jackie Kaplan  MRN:   [id number]  :   1965  ACCT. NUMBER: [id number]  DATE OF SERVICE: 23  START TIME:  1:00 PM  END TIME:  1:50 PM  FACILITATOR: Alka De La Paz Psy.D, LP  TOPIC:  Process Group    Diagnoses:  300.02 (F41.1) Generalized Anxiety Disorder.  296.32 (F33.1) Major Depressive Disorder, Recurrent Episode, Moderate _ and With anxious distress  309.9 (F43.9) Unspecified Trauma and Stressor Related Disorder.  5. 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       Per [facility]   PSYCHIATRY DISCHARGE SUMMARY  Admission Date and Time: 10/6/2023 3:11 AM   Discharge Date: 10/8/2023  Discharge Diagnoses   # 1: Bipolar I, manic with psychotic features   # 2: Cannabis use disorder, moderate     Per her report  PTSD                Psychiatry: [facility]        [address]  [place] [address]  Phone: [phone]          Individual Therapist/Name:  [facility] ([facility])  PCP: Mary Carbajal [facility] Day Treatment  TRACK: 6B    iop/dt2p    NUMBER OF PARTICIPANTS: 6        Data:    Session content: At the start of this group, patients were invited to check in by identifying themselves, describing their current emotional status, and identifying issues to address in this group.   Area(s) of treatment focus addressed in this session included Symptom Management, Personal Safety, and Community Resources/Discharge Planning.  Client reported being safe today.  Reported mood is \"ok, but waiting for the next shoe to drop.\"   Goal for today is to attend group therapy. The client talked to the group about how she has had so many bad things happen with her mental health and was \"locked up\" on inpatient mental health in [place]. She reported that she is used to things falling " "apart and doesn't expect things to be good for her. She talked about delusions, people wanting to harm her or others as \"terrorists or agents\" and talked about smart phones and the scary news on TV. The group discussed anxiety and fear from watching the news.       Therapeutic Interventions/Treatment Strategies:  Psychotherapist reinforced use of skills. Treatment modalities used include Cognitive Behavioral Therapy and Dialectical Behavioral Therapy. Interventions include Cognitive Restructuring:  Facilitated recognition of the connection between negative thoughts and negative core beliefs, Coping Skills: Assisted patient in identifying 1-2 healthy distraction skills to reduce overall distress, and Relapse Prevention: Assisted patient in identifying personal vulnerabilities, thoughts, emotions, and situations that may lead to relapse .    Assessment:    Patient response:   Patient responded to session by focusing on goals, being attentive, and appearing alert    Possible barriers to participation / learning include: severity of symptoms    Health Issues:   None reported       Substance Use Review:   Substance Use: Last use: smokes marijuana daily to get to sleep    Mental Status/Behavioral Observations  Appearance:   Appropriate   Eye Contact:   Good   Psychomotor Behavior: Normal   Attitude:   Cooperative   Orientation:   All  Speech   Rate / Production: Normal    Volume:  Normal   Mood:    Anxious   Affect:    Constricted   Thought Content:   Rumination and Psychosis reports paranoia and delusions  Thought Form:  Coherent  Logical  Psychosis    Insight:    Good     Plan:   Safety Plan: Recommended that patient call 911 or go to the local ED should there be a change in any of these risk factors.   Barriers to treatment: None identified  Patient Contracts (see media tab):  None  Substance Use: Provided encouragement towards sobriety   Continue or Discharge: Patient will continue in Adult Day Treatment (ADT)  as " planned. Patient is likely to benefit from learning and using skills as they work toward the goals identified in their treatment plan.      Alka De La Paz Psy.D, LP  November 7, 2023

## 2023-11-07 NOTE — GROUP NOTE
Psychoeducation Group Note    PATIENT'S NAME: Jackie Kaplan  MRN:   [id number]  :   1965  ACCT. NUMBER: [id number]  DATE OF SERVICE: 23  START TIME:  2:00 PM  END TIME:  2:50 PM  FACILITATOR: Carisa Yan LICSW; Gnena Patrick OTR  TOPIC:  Life Skills Group: Life Skills  [facility] Mental Health Day Treatment  TRACK: IOP/DT 2    NUMBER OF PARTICIPANTS: 6    Summary of Group / Topics Discussed:  Self-Regulation Skills: Coping Skills: Provided education on the benefits of exploring and identifying helpful coping skills to help manage distress and regulate emotions.  Discussed the importance of having a coping skills plan in times of increased symptoms.  Patients were given an opportunity to explore different types of coping skills (distraction, positive/reward, relaxation, mindfulness/grounding, safe ways to express feelings).  Patients self reflected on coping skills that they could engage in their daily life and add into their daily routine.      Patient Session Goals / Objectives:    Review the benefits of having different types of coping skills to help manage distress and regulate emotions    Explore different types of coping skills to promote self-regulation and independent skill use    Established a plan for practice of these skills in their own environments           Patient Participation / Response:  Fully participated with the group by sharing personal reflections / insights and openly received / provided feedback with other participants.    Verbalized understanding of content    Treatment Plan:  Patient has a current master individualized treatment plan.  See Epic treatment plan for more information.    CIERRA Galarza

## 2023-11-07 NOTE — GROUP NOTE
Psychotherapy Group Note    PATIENT'S NAME: Jackie Kaplan  MRN:   [id number]  :   1965  ACCT. NUMBER: [id number]  DATE OF SERVICE: 23  START TIME: 12:00 PM  END TIME: 12:50 PM  FACILITATOR: Alka De La Paz Psy.D, ANGUS  TOPIC:  EBP Group: Emotions Management  [facility] Mental Health Day Treatment  TRACK: iop/dt2p  6B    NUMBER OF PARTICIPANTS: 6    Summary of Group / Topics Discussed:  Emotions Management: Check Facts: Patients participated in an interactive approach to identifying and challenging cognitive distortions that arise following an event that triggers intense emotion.  Patients choose an emotional reaction/event to work on.  The group shared their experiences and thought processes for feedback.      Patient Session Goals / Objectives:  Learn the process of identifying thoughts associated with the situation and reaction  Learn to challenge cognitive distortions and reframe the situation/event/reaction   Distinguish between facts, feelings, thoughts  Gain understanding of how to interpret an emotional reaction  Practice identification of cognitive distortions and evaluating an emotionally charged situation more rationally/objectively/mindfully      Patient Participation / Response:  Fully participated with the group by sharing personal reflections / insights and openly received / provided feedback with other participants.    Expressed understanding of the relevance / importance of emotions management skills at distressing times in life    Treatment Plan:  Patient has a current master individualized treatment plan.  See Epic treatment plan for more information.    Alka De La Paz Psy.D, ANGUS

## 2023-11-09 ENCOUNTER — HOSPITAL ENCOUNTER (OUTPATIENT)
Dept: BEHAVIORAL HEALTH | Facility: CLINIC | Age: 58
Discharge: HOME | End: 2023-11-09
Attending: PSYCHIATRY & NEUROLOGY
Payer: COMMERCIAL

## 2023-11-09 PROCEDURE — 90853 GROUP PSYCHOTHERAPY: CPT | Performed by: SOCIAL WORKER

## 2023-11-09 PROCEDURE — 90853 GROUP PSYCHOTHERAPY: CPT | Performed by: PSYCHOLOGIST

## 2023-11-09 NOTE — GROUP NOTE
"Process Group Note    PATIENT'S NAME: Jackie Kaplan  MRN:   [id number]  :   1965  ACCT. NUMBER: [id number]  DATE OF SERVICE: 23  START TIME:  1:00 PM  END TIME:  1:50 PM  FACILITATOR: Alka De La Paz Psy.D, LP  TOPIC:  Process Group    Diagnoses:  300.02 (F41.1) Generalized Anxiety Disorder.  296.32 (F33.1) Major Depressive Disorder, Recurrent Episode, Moderate _ and With anxious distress  309.9 (F43.9) Unspecified Trauma and Stressor Related Disorder.  5. 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       Per [facility]   PSYCHIATRY DISCHARGE SUMMARY  Admission Date and Time: 10/6/2023 3:11 AM   Discharge Date: 10/8/2023  Discharge Diagnoses   # 1: Bipolar I, manic with psychotic features   # 2: Cannabis use disorder, moderate     Per her report  PTSD                Psych[facility]ry: [facility]        [address]  [place] [address]  Phone: [phone]          Individual Therapist/Name:  [facility] ([facility])  PCP: Mary Carbajal [facility] Adult Mental Health Outpatient Programs  TRACK: iop/dt2p   6B    NUMBER OF PARTICIPANTS: 7        Data:    Session content: At the start of this group, patients were invited to check in by identifying themselves, describing their current emotional status, and identifying issues to address in this group.   Area(s) of treatment focus addressed in this session included Symptom Management, Personal Safety, and Community Resources/Discharge Planning.  Client reported being safe today.  Reported mood is \"good.\"   Goal for today is to attend group therapy.  The client talked to the group about how she is proud that she got out of the house, came, drove in a warm care, and did chores today. She reported that she has a hard time leaving home and prefers not to be in public. She talked with " "others about panic attacks and dreams that she had in the past. 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 She stated that they will decorate them for the holidays.       Therapeutic Interventions/Treatment Strategies:  Psychotherapist reinforced use of skills. Treatment modalities used include Cognitive Behavioral Therapy and Dialectical Behavioral Therapy. Interventions include Coping Skills: Discussed how the use of intentional \"in the moment\" actions can help reduce distress, Relapse Prevention: Assisted patient in identifying personal vulnerabilities, thoughts, emotions, and situations that may lead to relapse , Symptoms Management: Promoted understanding of their diagnoses and how it impacts their functioning, and Emotions Management:  Discussed barriers to emotional regulation and Reviewed opposite action skill.    Assessment:    Patient response:   Patient responded to session by giving feedback, listening, and focusing on goals    Possible barriers to participation / learning include: severity of symptoms    Health Issues:   None reported       Substance Use Review:   Substance Use: Last use: smokes pot to get to sleep    Mental Status/Behavioral Observations  Appearance:   Appropriate   Eye Contact:   Good   Psychomotor Behavior: Normal   Attitude:   Cooperative   Orientation:   All  Speech   Rate / Production: Normal    Volume:  Normal   Mood:    Anxious   Affect:    Constricted   Thought Content:   Rumination and Psychosis reports paranoia  Thought Form:  Coherent  Logical     Insight:    Good     Plan:   Safety Plan: Recommended that patient call 911 or go to the local ED should there be a change in any of these risk factors.   Barriers to treatment: None identified  Patient Contracts (see media tab):  None  Substance Use: Provided encouragement towards sobriety   Continue or Discharge: Patient will continue in Adult Day Treatment (ADT) "  as planned. Patient is likely to benefit from learning and using skills as they work toward the goals identified in their treatment plan.      Jimmy Morocho.MIKIE, LP  November 9, 2023

## 2023-11-09 NOTE — GROUP NOTE
Psychotherapy Group Note    PATIENT'S NAME: Jackie Kaplan  MRN:   [id number]  :   1965  ACCT. NUMBER: [id number]  DATE OF SERVICE: 23  START TIME: 12:00 PM  END TIME: 12:50 PM  FACILITATOR: Alka De La Paz Psy.D, LP  TOPIC:  EBP Group: Emotions Management  [facility] Outpatient Programs  TRACK: iop/dt 6b     NUMBER OF PARTICIPANTS: 7    Summary of Group / Topics Discussed:  Emotions Management: Guilt and Shame: Patients explored and shared personal experiences associated with feelings of guilt and shame.  Group explored how these feelings develop, what they mean to each individual, and how to increase acceptance and usefulness of these feelings.  Group members assisted one another to contextualize these concepts and promote healing.     Patient Session Goals / Objectives:  Discuss and review definitions and personal views/experiences with guilt and shame  Understand the differences between guilt and shame  Explore how feelings of guilt and shame impact functioning  Understand and practice strategies to manage difficult emotions and move towards healing  Understand and normalize difficult emotions through group discussion  Understand the utility of guilt and shame  Target  unwanted  emotions for change      Patient Participation / Response:  Fully participated with the group by sharing personal reflections / insights and openly received / provided feedback with other participants.    Expressed understanding of the relevance / importance of emotions management skills at distressing times in life and Self-aware of experiences with difficult emotions, and strategies to employ to manage them    Treatment Plan:  Patient has a current master individualized treatment plan.  See Epic treatment plan for more information.    Alka De La Paz Psy.D, ANGUS

## 2023-11-09 NOTE — GROUP NOTE
Psychoeducation Group Note    PATIENT'S NAME: Jackie Kaplan  MRN:   [id number]  :   1965  ACCT. NUMBER: [id number]  DATE OF SERVICE: 23  START TIME:  2:00 PM  END TIME:  2:50 PM  FACILITATOR: Carisa Yan LICSW; Genna Patrick OTR  TOPIC:  Life Skills Group: Life Skills  [facility] Adult Mental Health Outpatient Programs  TRACK: IOP/DT 2 (6B)    NUMBER OF PARTICIPANTS: 7    Life Skills Group: Asking for & Accepting Help: Facilitated discussion on the importance of asking for and accepting help from others in their daily lives. Patients discussed potential barriers and benefits of seeking support. Reflected on a time they successfully asked for and received support, and why further developing this skill may be beneficial for mental wellbeing. Identified areas that they currently need support in, and were provided an opportunity to plan and problem solve how they will go about asking for and accepting help in those areas. Patients discussed with peers and staff regarding their progress, and ways they will initiate asking for help in the future. Validation, support, and feedback was provided throughout the group process.         Patient Session Goals / Objectives:        Identified potential barriers and benefits of asking for and accepting help    Improved awareness of important aspects of support for mental wellbeing     Reflected on a current area of life in which they need support    Practiced the skill of developing a plan for how to ask for and accept help in a specific area of their life       Patient Participation / Response:  Fully participated with the group by sharing personal reflections / insights and openly received / provided feedback with other participants.    Verbalized understanding of content    Treatment Plan:  Patient has a current master individualized treatment plan.  See Epic treatment plan for more information.    CIERRA Galarza

## 2023-11-13 ENCOUNTER — HOSPITAL ENCOUNTER (OUTPATIENT)
Dept: BEHAVIORAL HEALTH | Facility: CLINIC | Age: 58
Discharge: HOME | End: 2023-11-13
Attending: PSYCHIATRY & NEUROLOGY
Payer: COMMERCIAL

## 2023-11-13 PROCEDURE — 90853 GROUP PSYCHOTHERAPY: CPT | Performed by: SOCIAL WORKER

## 2023-11-13 PROCEDURE — 90853 GROUP PSYCHOTHERAPY: CPT | Performed by: PSYCHOLOGIST

## 2023-11-13 NOTE — GROUP NOTE
"Process Group Note    PATIENT'S NAME: Jackie Kaplan  MRN:   [id number]  :   1965  ACCT. NUMBER: [id number]  DATE OF SERVICE: 23  START TIME:  1:00 PM  END TIME:  1:50 PM  FACILITATOR: Alka De La Paz Psy.D, LP  TOPIC:  Process Group    Diagnoses:  300.02 (F41.1) Generalized Anxiety Disorder.  296.32 (F33.1) Major Depressive Disorder, Recurrent Episode, Moderate _ and With anxious distress  309.9 (F43.9) Unspecified Trauma and Stressor Related Disorder.  5. 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       Per [facility]   PSYCHIATRY DISCHARGE SUMMARY  Admission Date and Time: 10/6/2023 3:11 AM   Discharge Date: 10/8/2023  Discharge Diagnoses   # 1: Bipolar I, manic with psychotic features   # 2: Cannabis use disorder, moderate     Per her report  PTSD                Psychiatry: [facility]        [address]  [place] [address]  Phone: [phone]          Individual Therapist/Name:  [facility] ([facility])  PCP: Mary Carbajal [facility] Adult Mental Health Outpatient Programs  TRACK: iop/dt2p   6B    NUMBER OF PARTICIPANTS: 7        Data:    Session content: At the start of this group, patients were invited to check in by identifying themselves, describing their current emotional status, and identifying issues to address in this group.   Area(s) of treatment focus addressed in this session included Symptom Management, Personal Safety, and Community Resources/Discharge Planning.  Client reported being safe today.  Reported mood is \"ok.\"    Goal for today is to attend group therapy. The client talked to the group about how she went to the shop and helped organize things there. She stated that they are getting the pedi-0cabs ready for the holiday celebrations and people are decorating them for [place]. She showed " "others a picture of the cabs. She reported that she feels tired and \"blah\" from her medications, but takes them daily. She stated that she had a \"head cold' over the weekend and slept most of it.       Therapeutic Interventions/Treatment Strategies:  Psychotherapist reinforced use of skills. Treatment modalities used include Cognitive Behavioral Therapy and Dialectical Behavioral Therapy. Interventions include Coping Skills: Reviewed patients current calming practices and discussed a more formal way of practicing and accessing skills, Relapse Prevention: Facilitated understanding of effective coping skills in response to triggers for substance use, Mindfulness: Facilitated discussion of when/how to use mindfulness skills to benefit general health, mental health symptoms, and stressors, and Symptoms Management: Promoted understanding of their diagnoses and how it impacts their functioning.    Assessment:    Patient response:   Patient responded to session by giving feedback, listening, and focusing on goals    Possible barriers to participation / learning include: severity of symptoms    Health Issues:   None reported       Substance Use Review:   Substance Use: Last use: she smokes marijuana in the morning and evening for anxiety    Mental Status/Behavioral Observations  Appearance:   Appropriate   Eye Contact:   Good   Psychomotor Behavior: Normal   Attitude:   Cooperative   Orientation:   All  Speech   Rate / Production: Normal    Volume:  Normal   Mood:    Depressed   Affect:    Constricted   Thought Content:   Rumination and Psychosis reports paranoia and delusions  Thought Form:  Coherent  Logical     Insight:    Good     Plan:   Safety Plan: Recommended that patient call 911 or go to the local ED should there be a change in any of these risk factors.   Barriers to treatment: None identified  Patient Contracts (see media tab):  None  Substance Use: Provided encouragement towards sobriety   Continue or " Discharge: Patient will continue in Adult Day Treatment (ADT)  as planned. Patient is likely to benefit from learning and using skills as they work toward the goals identified in their treatment plan.      Alka De La Paz Psy.D, LP  November 13, 2023

## 2023-11-13 NOTE — GROUP NOTE
Psychoeducation Group Note    PATIENT'S NAME: Jackie Kaplan  MRN:   [id number]  :   1965  ACCT. NUMBER: [id number]  DATE OF SERVICE: 23  START TIME:  2:00 PM  END TIME:  2:50 PM  FACILITATOR: Carisa Yan LICSW; Sandy Campbell RN  TOPIC:  Wellness Group: [facility] Adult Mental Health Outpatient Programs  TRACK: IOP/DT 2 (6B)    NUMBER OF PARTICIPANTS: 6    Summary of Group / Topics Discussed:  Foundations of Health: Nutrition: Macronutrients: Patient were provided education on major macronutrients, their role in the body, and why it is important to meet daily nutritional needs. Obstacles to meeting daily nutritional needs were identified in group discussion and strategies to promote improved nutrition were explored. Macronutrients discussed include: carbohydrates, proteins and amino acids, fats, fiber, and water.     Patient Session Goals / Objectives:  Discussed the role of diet on mood, physical health, energy level, and the development of chronic disease.  Identified daily nutritional needs recommended by the [employer] via [employer] education resources  Developing increased health literacy skills in finding credible nutrition information from reliable sources      Patient Participation / Response:  Fully participated with the group by sharing personal reflections / insights and openly received / provided feedback with other participants.    Demonstrated understanding of topics discussed through group discussion and participation and Verbalized understanding of foundations of health topic    Treatment Plan:  Patient has a current master individualized treatment plan.  See Epic treatment plan for more information.    CIERRA Galarza

## 2023-11-13 NOTE — GROUP NOTE
Psychotherapy Group Note    PATIENT'S NAME: Jackie Kaplan  MRN:   [id number]  :   1965  ACCT. NUMBER: [id number]  DATE OF SERVICE: 23  START TIME: 12:00 PM  END TIME: 12:50 PM  FACILITATOR: Alka De La Paz Psy.D, LP  TOPIC:  EBP Group: Coping Skills  [facility] Mental [facility] Outpatient Programs  TRACK: iop/dt2p  6B    NUMBER OF PARTICIPANTS: 8    Summary of Group / Topics Discussed:  Coping Skills: Radical Acceptance: Patients learned radical acceptance as a way to tolerate heightened stress in the moment.  Patients identified situations that necessitate radical acceptance.  They focused on applying acceptance of the moment to tolerate difficult emotions and events. Patients discussed how to distinguish when this can be useful in their lives and when other skills are more relevant or helpful.    Patient Session Goals / Objectives:  Understand that some amount of pain exists for each of us in our lives  Process the difficulty of acceptance in our lives and benefits of radical acceptance to mood and functioning.  Demonstrate understanding of when to use the radical acceptance to tolerate distress by providing examples of situations where this could be applied.  Identify barriers to applying radical acceptance to difficult situations and explore strategies to overcome them      Patient Participation / Response:  Fully participated with the group by sharing personal reflections / insights and openly received / provided feedback with other participants.    Expressed understanding of the relevance / importance of coping skills at distressing times in life    Treatment Plan:  Patient has a current master individualized treatment plan.  See Epic treatment plan for more information.    Alka De La Paz Psy.D, LP

## 2023-11-14 ENCOUNTER — HOSPITAL ENCOUNTER (OUTPATIENT)
Dept: BEHAVIORAL HEALTH | Facility: CLINIC | Age: 58
Discharge: HOME | End: 2023-11-14
Attending: PSYCHIATRY & NEUROLOGY
Payer: COMMERCIAL

## 2023-11-14 PROCEDURE — 90853 GROUP PSYCHOTHERAPY: CPT | Performed by: PSYCHOLOGIST

## 2023-11-14 PROCEDURE — 90853 GROUP PSYCHOTHERAPY: CPT | Performed by: SOCIAL WORKER

## 2023-11-14 NOTE — GROUP NOTE
"Process Group Note    PATIENT'S NAME: Jackie Kaplan  MRN:   [id number]  :   1965  ACCT. NUMBER: [id number]  DATE OF SERVICE: 23  START TIME:  1:00 PM  END TIME:  1:50 PM  FACILITATOR: Alka De La Paz Psy.D, LP  TOPIC:  Process Group    Diagnoses:  300.02 (F41.1) Generalized Anxiety Disorder.  296.32 (F33.1) Major Depressive Disorder, Recurrent Episode, Moderate _ and With anxious distress  309.9 (F43.9) Unspecified Trauma and Stressor Related Disorder.  5. 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       Per [facility]   PSYCHIATRY DISCHARGE SUMMARY  Admission Date and Time: 10/6/2023 3:11 AM   Discharge Date: 10/8/2023  Discharge Diagnoses   # 1: Bipolar I, manic with psychotic features   # 2: Cannabis use disorder, moderate     Per her report  PTSD                Psychiatry: [facility]        [address]  [place] [address]  Phone: [phone]          Individual Therapist/Name:  [facility] ([facility])  PCP: [facility] Adult Mental Health Outpatient Programs  TRACK: iop/dt2p 6B    NUMBER OF PARTICIPANTS: 8        Data:    Session content: At the start of this group, patients were invited to check in by identifying themselves, describing their current emotional status, and identifying issues to address in this group.   Area(s) of treatment focus addressed in this session included Symptom Management, Personal Safety, and Community Resources/Discharge Planning.  Client reported being safe today.  Reported mood is \"good.\"    Goal for today is to attend group therapy.  The client talked to the group about how she didn't like it being so windy outside. She stated that she took a hot bath and relaxed and felt good. She reported that her medications make her ankles swollen and will report it to her clinic. " She stated that she uses the skill of one thing at a time and gets chores done. She stated that she will organize things at home.  She reported that she smokes some marijuana for her anxiety to get to sleep at night.      Therapeutic Interventions/Treatment Strategies:  Psychotherapist reinforced use of skills. Treatment modalities used include Cognitive Behavioral Therapy and Dialectical Behavioral Therapy. Interventions include Coping Skills: Reviewed patients current calming practices and discussed a more formal way of practicing and accessing skills, Relapse Prevention: Facilitated understanding of effective coping skills in response to triggers for substance use, Mindfulness: Encouraged a plan to use mindfulness skills in daily life, and Symptoms Management: Promoted understanding of their diagnoses and how it impacts their functioning.    Assessment:    Patient response:   Patient responded to session by giving feedback, listening, and focusing on goals    Possible barriers to participation / learning include: severity of symptoms    Health Issues:   None reported       Substance Use Review:   Substance Use: she smokes at night to get to sleep.    Mental Status/Behavioral Observations  Appearance:   Appropriate   Eye Contact:   Good   Psychomotor Behavior: Normal   Attitude:   Cooperative   Orientation:   All  Speech   Rate / Production: Normal    Volume:  Normal   Mood:    Anxious   Affect:    Constricted   Thought Content:   Rumination and Psychosis denies any symptoms of psychosis  Thought Form:  Coherent  Logical     Insight:    Good     Plan:   Safety Plan: Recommended that patient call 911 or go to the local ED should there be a change in any of these risk factors.   Barriers to treatment: None identified  Patient Contracts (see media tab):  None  Substance Use: Provided encouragement towards sobriety   Continue or Discharge: Patient will continue in Adult Day Treatment (ADT)  as planned. Patient is  likely to benefit from learning and using skills as they work toward the goals identified in their treatment plan.      Jimmy Morocho.MIKIE, LP  November 14, 2023

## 2023-11-14 NOTE — GROUP NOTE
Psychoeducation Group Note    PATIENT'S NAME: Jackie Kaplan  MRN:   [id number]  :   1965  [id number]  DATE OF SERVICE: 23  START TIME:  2:00 PM  END TIME:  2:50 PM  FACILITATOR: Carisa Yan LICSW; Genna Patrick OTR  TOPIC: MH Life Skills Group: Resiliency Development  [facility] Adult Mental Health Outpatient Programs  TRACK: IOP/DT 2 (6B)    NUMBER OF PARTICIPANTS: 6    Summary of Group / Topics Discussed:  Resiliency Development:  Coping Skills: Relaxation: Patients were provided with verbal and experiential education to identify physical and sensorimotor based activities that can be utilized for relaxation, stress management, self care, health maintenance, and self regulation.  Patients went through an experiential practice of different relaxation skills and identified skills they can use in their daily life to decrease anxiety and promote relaxation.  Patients increased knowledge and awareness of activities that support relaxation, build resiliency, and prevent relapse through healthy engagement in mindful focused activities.       Patient Session Goals / Objectives:    Identified specific physical and sensorimotor based activities for relaxation    Improved awareness of activities that are meaningful focused activities that support relaxation    Established a plan for practice of these skills in their own environments    Practiced and reflected on how to generalize taught skills to their everyday life : Patients were taught how to identify stressors, signs of stress, coping skills, and prevention strategies for overall stress management.  Patients were given the opportunity to identify both ongoing and acute mental health symptoms and how to effectively manage these symptoms by developing an effective aftercare plan.  Patients increased awareness of community based resources.      Patient Participation / Response:  Fully participated with the group by sharing  personal reflections / insights and openly received / provided feedback with other participants.    Verbalized understanding of content    Treatment Plan:  Patient has a current master individualized treatment plan.  See Epic treatment plan for more information.    Carisa Yan, ANEESHSW

## 2023-11-14 NOTE — GROUP NOTE
Psychotherapy Group Note    PATIENT'S NAME: Jackie Kaplan  MRN:   [id number]  :   1965  ACCT. NUMBER: [id number]  DATE OF SERVICE: 23  START TIME: 12:00 PM  END TIME: 12:50 PM  FACILITATOR: Alka De La Paz Psy.D, LP  TOPIC:  EBP Group: Coping Skills  [facility] Mental Health Outpatient Programs  TRACK: 8    NUMBER OF PARTICIPANTS: 8    Summary of Group / Topics Discussed:  Coping Skills: Improve the Moment: Patients learned to tolerate distress, by applying strategies to effect positive change in the present moment.  Patients will identified situations where they would benefit from applying strategies to improve the moment and reduce distress. Patients discussed how to distinguish when this can be useful in their lives or when other strategies would be more relevant or helpful.    Patient Session Goals / Objectives:  Discuss how the use of intentional  in the moment  actions can help reduce distress.  Review patients current practices and discuss a more formal way of practicing and accessing skills.  Increase ability to decide when to use improve the moment strategies  Choose 1-2 in the moment actions to apply during times of distress.      Patient Participation / Response:  Fully participated with the group by sharing personal reflections / insights and openly received / provided feedback with other participants.    Expressed understanding of the relevance / importance of coping skills at distressing times in life and Demonstrated knowledge of when to consider using a variety of coping skills in daily life    Treatment Plan:  Patient has a current master individualized treatment plan.  See Epic treatment plan for more information.    Alka De La Paz Psy.D, LP

## 2023-11-16 ENCOUNTER — HOSPITAL ENCOUNTER (OUTPATIENT)
Dept: BEHAVIORAL HEALTH | Facility: CLINIC | Age: 58
Discharge: HOME | End: 2023-11-16
Attending: PSYCHIATRY & NEUROLOGY
Payer: COMMERCIAL

## 2023-11-16 PROCEDURE — 90853 GROUP PSYCHOTHERAPY: CPT | Performed by: PSYCHOLOGIST

## 2023-11-16 PROCEDURE — 90853 GROUP PSYCHOTHERAPY: CPT | Performed by: SOCIAL WORKER

## 2023-11-16 NOTE — GROUP NOTE
Psychotherapy Group Note    PATIENT'S NAME: Jackie Kaplan  MRN:   [id number]  :   1965  ACCT. NUMBER: [id number]  DATE OF SERVICE: 23  START TIME:  2:00 PM  END TIME:  2:50 PM  FACILITATOR: Carisa Yan LICSW; Genna Patrick OTR  TOPIC: MH EBP Group: Coping Skills  [facility] Outpatient Programs  TRACK: IOP/DT 3    NUMBER OF PARTICIPANTS: 7    Summary of Group / Topics Discussed:  Coping Skills: Self-Soothe with the senses: Patients learned to apply self-soothe as a way to decrease heightened stress in the moment.  Patients identified situations that necessitate self-soothe strategies.  They focused on ways to manage physical symptoms of distress using the senses. They discussed how to distinguish when this can be useful in their lives when other strategies are more relevant or helpful.    Patient Session Goals / Objectives:  Understand the purpose of using the senses to decrease distress  Process what happens in the body when using self-soothe strategies  Demonstrate understanding of when to use self-soothe strategies  Practice mindfulness with the 8 senses.      Patient Participation / Response:  Fully participated with the group by sharing personal reflections / insights and openly received / provided feedback with other participants.    Demonstrated understanding of topics discussed through group discussion and participation    Treatment Plan:  Patient has a current master individualized treatment plan.  See Epic treatment plan for more information.    CIERRA Galarza

## 2023-11-16 NOTE — GROUP NOTE
Process Group Note    PATIENT'S NAME: Jackie Kaplan  MRN:   [id number]  :   1965  ACCT. NUMBER: [id number]  DATE OF SERVICE: 23  START TIME:  1:00 PM  END TIME:  1:50 PM  FACILITATOR: Alka De La Paz Psy.D, LP  TOPIC:  Process Group    Diagnoses:  300.02 (F41.1) Generalized Anxiety Disorder.  296.32 (F33.1) Major Depressive Disorder, Recurrent Episode, Moderate _ and With anxious distress  309.9 (F43.9) Unspecified Trauma and Stressor Related Disorder.  5. 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       Per [facility]   PSYCHIATRY DISCHARGE SUMMARY  Admission Date and Time: 10/6/2023 3:11 AM   Discharge Date: 10/8/2023  Discharge Diagnoses   # 1: Bipolar I, manic with psychotic features   # 2: Cannabis use disorder, moderate     Per her report  PTSD                [facility]iatry: [facility]        [address]  [place] [address]  Phone: [phone]          Individual Therapist/Name:  [facility] ([facility])  PCP: [facility] Adult Mental Health Outpatient Programs  TRACK: iop/dt2p 6B     NUMBER OF PARTICIPANTS: 7        Data:    Session content: At the start of this group, patients were invited to check in by identifying themselves, describing their current emotional status, and identifying issues to address in this group.   Area(s) of treatment focus addressed in this session included Symptom Management, Personal Safety, and Community Resources/Discharge Planning.  Client reported being safe today.  Reported mood is   Frustrated.    Goal for today is to attend group therapy. The client talked to the group about how she calmly told the person who drove her here not to answer phone calls and interrupt her conversation when driving. She talked about not blowing up about the situation and remaining calm.  The group validated her experience. She stated that she is doing things differently and trying to get out of the house more and not isolate.       Therapeutic Interventions/Treatment Strategies:  Psychotherapist reinforced use of skills. Treatment modalities used include Cognitive Behavioral Therapy and Dialectical Behavioral Therapy. Interventions include Coping Skills: Promoted understanding of how and when to apply grounding strategies to reduce distress and increase presence in the moment, Relapse Prevention: Facilitated understanding of effective coping skills in response to triggers for substance use, Mindfulness: Facilitated discussion of when/how to use mindfulness skills to benefit general health, mental health symptoms, and stressors, and Symptoms Management: Promoted understanding of their diagnoses and how it impacts their functioning.    Assessment:    Patient response:   Patient responded to session by accepting feedback, giving feedback, and listening    Possible barriers to participation / learning include: severity of symptoms    Health Issues:   None reported       Substance Use Review:   Substance Use: She smokes at night to get to sleep  Mental Status/Behavioral Observations  Appearance:   Appropriate   Eye Contact:   Good   Psychomotor Behavior: Normal   Attitude:   Cooperative   Orientation:   All  Speech   Rate / Production: Normal    Volume:  Normal   Mood:    Anxious  Depressed   Affect:    Constricted   Thought Content:   Rumination and Psychosis denies any symptoms of psychosis  Thought Form:  Coherent  Logical     Insight:    Good     Plan:   Safety Plan: Recommended that patient call 911 or go to the local ED should there be a change in any of these risk factors.   Barriers to treatment: None identified  Patient Contracts (see media tab):  None  Substance Use: Not addressed in session   Continue or Discharge: Patient will continue in Adult Day Treatment (ADT)  as planned. Patient is  likely to benefit from learning and using skills as they work toward the goals identified in their treatment plan.      Jimmy Morocho.MIKIE, LP  November 16, 2023

## 2023-11-16 NOTE — GROUP NOTE
Psychotherapy Group Note    PATIENT'S NAME: Jackie Kaplan  MRN:   [id number]  :   1965  ACCT. NUMBER: [id number]  DATE OF SERVICE: 23  START TIME: 12:00 PM  END TIME: 12:50 PM  FACILITATOR: Alka De La Paz Psy.D, LP  TOPIC: MH EBP Group: Coping Skills  [facility] Adult Mental Health Outpatient Programs  TRACK: iop/dt2p 6B    NUMBER OF PARTICIPANTS: 7    Summary of Group / Topics Discussed:  Coping Skills: Building Positive Experiences: Patients discussed the importance of planning and engaging in positive experiences, as strategies to increase positive thinking, hope, and self-worth.  Explored the benefits of planning / creating positive experiences, including recognizing and reducing negativity bias by focusing on and building positive experiences.   Several approaches to building positive experiences were presented and discussed relevant to each patient.      Patient Session Goals / Objectives:  Understand the purpose of planning / creating / participating / sharing in positive experiences.  Explore patient s experiences related to negative thinking and how it influences activities and moodIdentify current positive events in patient s life.   Set goals to increase a variety of positive experiences.  Address barriers to planning / engaging in positive experiences.      Patient Participation / Response:  Fully participated with the group by sharing personal reflections / insights and openly received / provided feedback with other participants.    Demonstrated understanding of topics discussed through group discussion and participation    Treatment Plan:  Patient has a current master individualized treatment plan.  See Epic treatment plan for more information.    Alka De La Paz Psy.D, ANGUS

## 2023-11-20 ENCOUNTER — HOSPITAL ENCOUNTER (OUTPATIENT)
Dept: BEHAVIORAL HEALTH | Facility: CLINIC | Age: 58
Discharge: HOME | End: 2023-11-20
Attending: PSYCHIATRY & NEUROLOGY
Payer: COMMERCIAL

## 2023-11-20 PROCEDURE — 90853 GROUP PSYCHOTHERAPY: CPT | Performed by: SOCIAL WORKER

## 2023-11-20 PROCEDURE — 90853 GROUP PSYCHOTHERAPY: CPT | Performed by: PSYCHOLOGIST

## 2023-11-20 NOTE — GROUP NOTE
Psychotherapy Group Note    PATIENT'S NAME: Jackie Kaplan  MRN:   [id number]  :   1965  ACCT. NUMBER: [id number]  DATE OF SERVICE: 23  START TIME:  2:00 PM  END TIME:  2:50 PM  FACILITATOR: Carisa Yan LICSW; Sandy Campbell RN  TOPIC: MH EBP Group: Coping Skills  [facility] Mental Health Outpatient Programs  TRACK: IOP/DT 2    NUMBER OF PARTICIPANTS: 6    Summary of Group / Topics Discussed:  Coping Skills: Discharge and Relapse Planning: Patients discussed and increased understanding of how anticipating and planning for possible increased symptoms is a proactive way to reduce the likelihood of relapse.  Patients shared individualized factors that may lead to increased symptoms and decompensation in functioning.  Each patient developed a discharge  plan with community resources.      Patient Session Goals / Objectives:  Identify and understand what factors may contribute to symptom relapse .  Create an individualized written relapse plan  Problem solve barriers to plan creation and implementation  Share relapse plan with key support people        Patient Session Goals / Objectives:  Identify and understand what factors may contribute to symptom relapse.  Identify actions that may be taken to manage increased symptoms/stressors.  Create an individualized written relapse plan  Problem solve barriers to plan creation and implementation      Patient Participation / Response:  Fully participated with the group by sharing personal reflections / insights and openly received / provided feedback with other participants.    Demonstrated understanding of topics discussed through group discussion and participation    Treatment Plan:  Patient has a current master individualized treatment plan.  See Epic treatment plan for more information.    CIERRA Galarza

## 2023-11-20 NOTE — GROUP NOTE
Psychotherapy Group Note    PATIENT'S NAME: Jackie Kaplan  MRN:   [id number]  :   1965  ACCT. NUMBER: [id number]  DATE OF SERVICE: 23  START TIME: 12:00 PM  END TIME: 12:50 PM  FACILITATOR: Alka De La Paz Psy.D, LP  TOPIC: MH EBP Group: Cognitive Restructuring  [facility] Mental Health Outpatient Programs  TRACK: iop/dt 2p     6B    NUMBER OF PARTICIPANTS: 6    Summary of Group / Topics Discussed:  Cognitive Restructuring: Distortions: Patients received an overview of how to identify common cognitive distortions. Patients will explore alternatives to cognitive distortions and practice challenging their negative thought patterns. The goal is to help patients target modify ineffective thought patterns.     Patient Session Goals / Objectives:  Familiarized self with ineffective / unhealthy thoughts and how they develop.    Explored impact of ineffective thoughts / distortions on mood and activity  Formulated new neutral/positive alternatives to challenge less helpful / ineffective thoughts.  Practiced and plan to apply in daily life             Patient Participation / Response:  Fully participated with the group by sharing personal reflections / insights and openly received / provided feedback with other participants.    Expressed understanding of the relationship between behaviors, thoughts, and feelings    Treatment Plan:  Patient has a current master individualized treatment plan.  See Epic treatment plan for more information.    Alka De La Paz Psy.D, ANGUS

## 2023-11-20 NOTE — GROUP NOTE
"Process Group Note    PATIENT'S NAME: Jackie Kaplan  MRN:   [id number]  :   1965  ACCT. NUMBER: [id number]  DATE OF SERVICE: 23  START TIME:  1:00 PM  END TIME:  1:50 PM  FACILITATOR: Alka De La Paz Psy.D, LP  TOPIC:  Process Group    Diagnoses:  300.02 (F41.1) Generalized Anxiety Disorder.  296.32 (F33.1) Major Depressive Disorder, Recurrent Episode, Moderate _ and With anxious distress  309.9 (F43.9) Unspecified Trauma and Stressor Related Disorder.  5. 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       Per [facility]   PSYCHIATRY DISCHARGE SUMMARY  Admission Date and Time: 10/6/2023 3:11 AM   Discharge Date: 10/8/2023  Discharge Diagnoses   # 1: Bipolar I, manic with psychotic features   # 2: Cannabis use disorder, moderate     Per her report  PTSD                [facility]iatry: [facility]        [address]  [place] [address]  Phone: [phone]          Individual Therapist/Name:  [facility] ([facility])  PCP: Mary Carbaj[facility]  TRACK: iop/dt2-   6B    NUMBER OF PARTICIPANTS: 6        Data:    Session content: At the start of this group, patients were invited to check in by identifying themselves, describing their current emotional status, and identifying issues to address in this group.   Area(s) of treatment focus addressed in this session included Symptom Management, Personal Safety, and Community Resources/Discharge Planning.  Client reported being safe today.  Reported mood is \"good.\"    Goal for today is to attend group therapy. The client talked to the group about how she got a ride here from  a friend and was patient, didn't yell or comment on the drive.  She reported that she has some swelling from the medication and will tell the clinic about the side " effect.   She reported that she smokes marijuana at night to help with anxiety and get to sleep, and that she takes her medications.       Therapeutic Interventions/Treatment Strategies:  Psychotherapist reinforced use of skills. Treatment modalities used include Cognitive Behavioral Therapy and Dialectical Behavioral Therapy. Interventions include Relapse Prevention: Coached on skills to manage factors that contribute to relapse, Mindfulness: Facilitated discussion of when/how to use mindfulness skills to benefit general health, mental health symptoms, and stressors, Symptoms Management: Promoted understanding of their diagnoses and how it impacts their functioning, and Emotions Management:  Reviewed opposite action skill.    Assessment:    Patient response:   Patient responded to session by giving feedback    Possible barriers to participation / learning include: severity of symptoms    Health Issues:   Yes: swelling in ankles from medication       Substance Use Review:   Substance Use: Last use: smokes marijuana to get to sleep    Mental Status/Behavioral Observations  Appearance:   Appropriate   Eye Contact:   Good   Psychomotor Behavior: Normal   Attitude:   Cooperative   Orientation:   All  Speech   Rate / Production: Normal    Volume:  Normal   Mood:    Anxious  Sad   Affect:    Constricted   Thought Content:   Clear  Thought Form:  Coherent  Logical     Insight:    Good     Plan:   Safety Plan: Recommended that patient call 911 or go to the local ED should there be a change in any of these risk factors.   Barriers to treatment: None identified  Patient Contracts (see media tab):  None  Substance Use: Provided encouragement towards sobriety   Continue or Discharge: Patient will continue in Adult Day Treatment (ADT)  as planned. Patient is likely to benefit from learning and using skills as they work toward the goals identified in their treatment plan.      Jimmy Morocho.MIKIE, LP  November 20, 2023

## 2023-11-21 ENCOUNTER — HOSPITAL ENCOUNTER (OUTPATIENT)
Dept: BEHAVIORAL HEALTH | Facility: CLINIC | Age: 58
Discharge: HOME | End: 2023-11-21
Attending: PSYCHIATRY & NEUROLOGY
Payer: COMMERCIAL

## 2023-11-21 PROCEDURE — 90853 GROUP PSYCHOTHERAPY: CPT | Performed by: PSYCHOLOGIST

## 2023-11-21 PROCEDURE — 90853 GROUP PSYCHOTHERAPY: CPT | Performed by: SOCIAL WORKER

## 2023-11-21 NOTE — GROUP NOTE
"Process Group Note    PATIENT'S NAME: Jackie Kaplan  MRN:   [id number]  :   1965  ACCT. NUMBER: [id number]  DATE OF SERVICE: 23  START TIME:  1:00 PM  END TIME:  1:50 PM  FACILITATOR: Alka De La Paz Psy.D, LP  TOPIC:  Process Group    Diagnoses:  300.02 (F41.1) Generalized Anxiety Disorder.  296.32 (F33.1) Major Depressive Disorder, Recurrent Episode, Moderate _ and With anxious distress  309.9 (F43.9) Unspecified Trauma and Stressor Related Disorder.  5. 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       Per [facility]   PSYCHIATRY DISCHARGE SUMMARY  Admission Date and Time: 10/6/2023 3:11 AM   Discharge Date: 10/8/2023  Discharge Diagnoses   # 1: Bipolar I, manic with psychotic features   # 2: Cannabis use disorder, moderate     Per her report  PTSD                Psychiatry: [facility]        [address] [facility] [address]  [place] [address]  Phone: [phone]          Individual Therapist/Name:  [facility] ([facility])  PCP: Mary [facility]  TRACK: iop/dt2p  6B    NUMBER OF PARTICIPANTS: 5        Data:    Session content: At the start of this group, patients were invited to check in by identifying themselves, describing their current emotional status, and identifying issues to address in this group.   Area(s) of treatment focus addressed in this session included Symptom Management, Personal Safety, and Community Resources/Discharge Planning.  Client reported being safe today.  Reported mood is \"good.\"   Goal for today is to attend group therapy. The client talked to the group about how she is trying to be more patient with others, is doing deep breathing, and did the tapping to stay grounded.  She stated that she had grief and loss issues and talked to the group about her family, " her step-father, and how she was not allowed to attend her mom's . She reported feeling resentful towards them and the group validated her experiences. She talked about her ex-, who has been a support in ways for her during the treatment and how he drives her to the program at times.       Therapeutic Interventions/Treatment Strategies:  Psychotherapist reinforced use of skills. Treatment modalities used include Cognitive Behavioral Therapy and Dialectical Behavioral Therapy. Interventions include Cognitive Restructuring:  Facilitated recognition of the connection between negative thoughts and negative core beliefs, Mindfulness: Facilitated discussion of when/how to use mindfulness skills to benefit general health, mental health symptoms, and stressors, Symptoms Management: Promoted understanding of their diagnoses and how it impacts their functioning, and Emotions Management:  Discussed barriers to emotional regulation and Reviewed opposite action skill.    Assessment:    Patient response:   Patient responded to session by accepting feedback, listening, and being attentive    Possible barriers to participation / learning include: severity of symptoms    Health Issues:   None reported       Substance Use Review:   Substance Use: Last use: she uses marijuana to get to sleep  and has a little bit    Mental Status/Behavioral Observations  Appearance:   Appropriate   Eye Contact:   Good   Psychomotor Behavior: Normal   Attitude:   Cooperative   Orientation:   All  Speech   Rate / Production: Normal    Volume:  Normal   Mood:    Anxious  Sad   Affect:    Constricted   Thought Content:   Rumination and Psychosis denies any symptoms of psychosis  Thought Form:  Coherent  Logical     Insight:    Good     Plan:   Safety Plan: Recommended that patient call 911 or go to the local ED should there be a change in any of these risk factors.   Barriers to treatment: None identified  Patient Contracts (see media tab):   None  Substance Use: Provided encouragement towards sobriety   Continue or Discharge: Patient will continue in Adult Day Treatment (ADT)  as planned. Patient is likely to benefit from learning and using skills as they work toward the goals identified in their treatment plan.      Jimmy Morocho.MIKIE, LP  November 21, 2023

## 2023-11-21 NOTE — GROUP NOTE
Psychoeducation Group Note    PATIENT'S NAME: Jackie Kaplan  MRN:   [id number]  :   1965  ACCT. NUMBER: [id number]  DATE OF SERVICE: 23  START TIME:  2:00 PM  END TIME:  2:50 PM  FACILITATOR: Carisa Yan LICSW; Genna Patrick OTR  TOPIC: MH Life Skills Group: Communication and Social Skills Development  [facility]  TRACK: IOP/DT 2    NUMBER OF PARTICIPANTS: 4    Summary of Group / Topics Discussed:  Communication and Social Skills Development: Social Participation: Facilitated discussion on the importance of social participation in their daily lives and identified areas that they are currently participating in socially. Patients discussed the benefits of social participation on their mental wellbeing and social values. Patients were provided with an opportunity to engage in a social leisure activity. Patients identified and discussed with peers and staff regarding their experience of practicing social skills (being assertive, setting boundaries, accepting positive feedback) and ways to stay connected with others. Validation, support, and feedback was provided throughout the group process.         Patient Session Goals / Objectives:    Identified strengths and opportunities for growth in the area of social participation    Improved awareness of important aspects of social participation for mental wellbeing    Engage with other peers for experiential learning of social leisure skills    Practiced and reflected on how to generalize social participation skills in their everyday life       Patient Participation / Response:  Fully participated with the group by sharing personal reflections / insights and openly received / provided feedback with other participants.    Verbalized understanding of content    Treatment Plan:  Patient has a current master individualized treatment plan.  See Epic treatment plan for more information.    Carisa ALLEN  ANEESH YanSW

## 2023-11-21 NOTE — GROUP NOTE
Psychotherapy Group Note    PATIENT'S NAME: Jackie Kaplan  MRN:   [id number]  :   1965  ACCT. NUMBER: [id number]  DATE OF SERVICE: 23  START TIME: 12:00 PM  END TIME: 12:50 PM  FACILITATOR: Alka De La Paz Psy.D, LP  TOPIC: MH EBP Group: Cognitive Restructuring  [facility]  TRACK: iop/dt 2p   6B    NUMBER OF PARTICIPANTS: 5    Summary of Group / Topics Discussed:  Cognitive Restructuring: Core Beliefs: Patients received an overview of what a core belief is, and how they develop. Patients then began to identify their negative core beliefs. Patients worked to modify core beliefs with the goal of improved self-image and functioning.     Patient Session Goals / Objectives:  Familiarize self with the concept of core beliefs and how they develop.    Explore personal core beliefs (positive and negative)  Develop / advance recognition of the connection between negative thoughts and negative core beliefs.  Formulate new neutral/positive core beliefs             Patient Participation / Response:  Fully participated with the group by sharing personal reflections / insights and openly received / provided feedback with other participants.    Expressed understanding of the relationship between behaviors, thoughts, and feelings    Treatment Plan:  Patient has a current master individualized treatment plan.  See Epic treatment plan for more information.    Alka De La Paz Psy.D, ANGUS

## 2023-11-27 ENCOUNTER — HOSPITAL ENCOUNTER (OUTPATIENT)
Dept: BEHAVIORAL HEALTH | Facility: CLINIC | Age: 58
Discharge: HOME | End: 2023-11-27
Attending: PSYCHIATRY & NEUROLOGY
Payer: COMMERCIAL

## 2023-11-27 PROCEDURE — 90853 GROUP PSYCHOTHERAPY: CPT | Performed by: SOCIAL WORKER

## 2023-11-27 PROCEDURE — 90853 GROUP PSYCHOTHERAPY: CPT | Performed by: PSYCHOLOGIST

## 2023-11-27 NOTE — GROUP NOTE
Psychoeducation Group Note    PATIENT'S NAME: Jackie Kaplan  MRN:   [id number]  :   1965  ACCT. NUMBER: [id number]  DATE OF SERVICE: 23  START TIME:  2:00 PM  END TIME:  2:50 PM  FACILITATOR: Carisa Yan LICSW; Sandy Campbell RN  TOPIC:  Wellness Group: [facility] Adult Mental [facility] Outpatient Programs  TRACK: IOP/DT 2 (6B)    NUMBER OF PARTICIPANTS: 6    Summary of Group / Topics Discussed:  Foundations of Health: Sleep: Case study/sleep hygiene: Patients explored the connection between sleep and mental illness. Patients learned about how adequate sleep can improve health, productivity, wellness, quality of life, and safety.     Patient Session Goals / Objectives:  Demonstrated understanding of sleep hygiene practices and benefits of sleep  Identified sleep hygiene strategies to utilize   Described the connection between sleep disturbances and mental illness      Patient Participation / Response:  Fully participated with the group by sharing personal reflections / insights and openly received / provided feedback with other participants.    Demonstrated understanding of topics discussed through group discussion and participation and Verbalized understanding of foundations of health topic    Treatment Plan:  Patient has a current master individualized treatment plan.  See Epic treatment plan for more information.    CIERRA Galarza

## 2023-11-27 NOTE — GROUP NOTE
"Process Group Note    PATIENT'S NAME: Jackie Kaplan  MRN:   [id number]  :   1965  ACCT. NUMBER: [id number]  DATE OF SERVICE: 23  START TIME:  1:00 PM  END TIME:  1:50 PM  FACILITATOR: Alka De La Paz Psy.D, LP  TOPIC:  Process Group    Diagnoses:    300.02 (F41.1) Generalized Anxiety Disorder.  296.32 (F33.1) Major Depressive Disorder, Recurrent Episode, Moderate _ and With anxious distress  309.9 (F43.9) Unspecified Trauma and Stressor Related Disorder.  5. 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       Per [facility]   PSYCHIATRY DISCHARGE SUMMARY  Admission Date and Time: 10/6/2023 3:11 AM   Discharge Date: 10/8/2023  Discharge Diagnoses   # 1: Bipolar I, manic with psychotic features   # 2: Cannabis use disorder, moderate     Per her report  PTSD                Psychiatry: [facility]        [address]  [place] [address]  Phone: [phone]          Individual Therapist/Name:  [facility] ([facility])  PCP: Mary Carbajal [facility]  TRACK: iop/dt2p  6B    NUMBER OF PARTICIPANTS: 6        Data:    Session content: At the start of this group, patients were invited to check in by identifying themselves, describing their current emotional status, and identifying issues to address in this group.   Area(s) of treatment focus addressed in this session included Symptom Management, Personal Safety, and Community Resources/Discharge Planning.  Client reported being safe today.  Reported mood is \"ok, but some sadness from grief and loss.\"    Goal for today is to attend group therapy. The client talked to the group about how she will \"keep on going and try not to get caught up on depression and regretful thoughts.\" She stated that she did deep breathing when driving to stop herself " from crying. She stated that she takes her medications and enjoys coming to the group for support.       Therapeutic Interventions/Treatment Strategies:  Psychotherapist provided redirection. Treatment modalities used include Cognitive Behavioral Therapy and Dialectical Behavioral Therapy. Interventions include Coping Skills: Assisted patient in identifying 1-2 healthy distraction skills to reduce overall distress, Relapse Prevention: Facilitated understanding of effective coping skills in response to triggers for substance use, Mindfulness: Facilitated discussion of when/how to use mindfulness skills to benefit general health, mental health symptoms, and stressors, and Symptoms Management: Promoted understanding of their diagnoses and how it impacts their functioning.    Assessment:    Patient response:   Patient responded to session by listening, focusing on goals, and being attentive    Possible barriers to participation / learning include: severity of symptoms    Health Issues:   None reported       Substance Use Review:   Substance Use: Last use: smokes marijuana to get to sleep.    Mental Status/Behavioral Observations  Appearance:   Appropriate   Eye Contact:   Good   Psychomotor Behavior: Normal   Attitude:   Cooperative   Orientation:   All  Speech   Rate / Production: Normal    Volume:  Normal   Mood:    Anxious  Depressed   Affect:    Constricted   Thought Content:   Rumination and Psychosis denies any symptoms of psychosis  Thought Form:  Coherent  Logical     Insight:    Good     Plan:   Safety Plan: Recommended that patient call 911 or go to the local ED should there be a change in any of these risk factors.   Barriers to treatment: None identified  Patient Contracts (see media tab):  None  Substance Use: Provided encouragement towards sobriety   Continue or Discharge: Patient will continue in Adult Day Treatment (ADT)  as planned. Patient is likely to benefit from learning and using skills as they  work toward the goals identified in their treatment plan.      Alka De La Paz Psy.D, LP  November 27, 2023

## 2023-11-28 ENCOUNTER — HOSPITAL ENCOUNTER (OUTPATIENT)
Dept: BEHAVIORAL HEALTH | Facility: CLINIC | Age: 58
Discharge: HOME | End: 2023-11-28
Attending: PSYCHIATRY & NEUROLOGY
Payer: COMMERCIAL

## 2023-11-28 PROCEDURE — 90853 GROUP PSYCHOTHERAPY: CPT | Performed by: PSYCHOLOGIST

## 2023-11-28 PROCEDURE — 90853 GROUP PSYCHOTHERAPY: CPT | Performed by: SOCIAL WORKER

## 2023-11-28 NOTE — GROUP NOTE
Psychoeducation Group Note    PATIENT'S NAME: Jackie Kaplan  MRN:   [id number]  :   1965  ACCT. NUMBER: [id number]  DATE OF SERVICE: 23  START TIME:  2:00 PM  END TIME:  2:50 PM  FACILITATOR: Carisa Yan LICSW; Genna Patrick OTR  TOPIC: MH Life Skills Group: Resiliency Development  [facility]  TRACK: IOP/DT 2    NUMBER OF PARTICIPANTS: 6    Summary of Group / Topics Discussed:  Resiliency Development:  Coping Skills: Procrastination: Patients discussed the impact of procrastination on their daily function. Patients explored 15 different motivation strategies to improve engagement in daily life. Patients self reflected on daily tasks they have been procrastinating and identified a motivation strategy they would like to try. Patients created a goal and the first step into decreasing procrastination and improving motivation in their daily life. Validation, support, and feedback were provided throughout group.        Patient Session Goals / Objectives:   *Identify current patterns of procrastination behavior and how they influence thoughts and moods, and inhibit motivation.   *Identify behaviors that can be implemented that contribute to improving thoughts and feelings, motivation, and reduce symptoms.   *Identify and develop a plan to increase activities that promote a sense of accomplishment and competence.   *Practice scheduling positive activities / behaviors into daily routines.       Patient Participation / Response:  Fully participated with the group by sharing personal reflections / insights and openly received / provided feedback with other participants.    Verbalized understanding of content    Treatment Plan:  Patient has a current master individualized treatment plan.  See Epic treatment plan for more information.    CIERRA Galarza

## 2023-11-28 NOTE — GROUP NOTE
"Process Group Note    PATIENT'S NAME: Jackie Kaplan  MRN:   [id number]  :   1965  ACCT. NUMBER: [id number]  DATE OF SERVICE: 23  START TIME:  1:00 PM  END TIME:  1:50 PM  FACILITATOR: Alka De La Paz Psy.D, LP  TOPIC:  Process Group    Diagnoses:  300.02 (F41.1) Generalized Anxiety Disorder.  296.32 (F33.1) Major Depressive Disorder, Recurrent Episode, Moderate _ and With anxious distress  309.9 (F43.9) Unspecified Trauma and Stressor Related Disorder.  5. 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       Per [facility]   PSYCHIATRY DISCHARGE SUMMARY  Admission Date and Time: 10/6/2023 3:11 AM   Discharge Date: 10/8/2023  Discharge Diagnoses   # 1: Bipolar I, manic with psychotic features   # 2: Cannabis use disorder, moderate     Per her report  PTSD                Psychiatry: [facility]        [address]  [place] [address]  Phone: [phone]          Individual Therapist/Name:  [facility] ([facility])  PCP: Mary Carb[facility] Adult [facility] Out[facility]  TRACK: iop/dt2p  6B    NUMBER OF PARTICIPANTS: 7        Data:    Session content: At the start of this group, patients were invited to check in by identifying themselves, describing their current emotional status, and identifying issues to address in this group.   Area(s) of treatment focus addressed in this session included Symptom Management, Personal Safety, and Community Resources/Discharge Planning.  Client reported being safe today.  Reported mood is \"alright.\"    Goal for today is to attend group therapy. The client talked to the group about grief and loss issues, feeling sad and more emotional.\" She stated that she felt better after arriving at the group. She stated that she was more assertive today and asked to not be at group on " Thursday to meet some friends. She stated that she was proud of what she did. 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  She stated that she used music to calm herself and is responding to anger in a calmer method.       Therapeutic Interventions/Treatment Strategies:  Psychotherapist reinforced use of skills. Treatment modalities used include Cognitive Behavioral Therapy and Dialectical Behavioral Therapy. Interventions include Coping Skills: Promoted understanding of how and when to apply grounding strategies to reduce distress and increase presence in the moment, Relapse Prevention: Facilitated understanding of effective coping skills in response to triggers for substance use, Mindfulness: Facilitated discussion of when/how to use mindfulness skills to benefit general health, mental health symptoms, and stressors, and Symptoms Management: Promoted understanding of their diagnoses and how it impacts their functioning.    Assessment:    Patient response:   Patient responded to session by giving feedback, listening, and being attentive    Possible barriers to participation / learning include: severity of symptoms    Health Issues:   None reported       Substance Use Review:   Substance Use: Last use: \smokes marijuana daily to get to sleep    Mental Status/Behavioral Observations  Appearance:   Appropriate   Eye Contact:   Good   Psychomotor Behavior: Normal   Attitude:   Cooperative   Orientation:   All  Speech   Rate / Production: Normal    Volume:  Normal   Mood:    Anxious  Depressed  Sad   Affect:    Constricted   Thought Content:   Rumination and Psychosis denies any symptoms of psychosis  Thought Form:  Coherent  Logical     Insight:    Good     Plan:   Safety Plan: Recommended that patient call 911 or go to the local ED should there be a change in any of these risk  factors.   Barriers to treatment: None identified  Patient Contracts (see media tab):  None  Substance Use: Provided encouragement towards sobriety   Continue or Discharge: Patient will continue in Adult Day Treatment (ADT)  as planned. Patient is likely to benefit from learning and using skills as they work toward the goals identified in their treatment plan.      Jimmy Morocho.MIKIE, LP  November 28, 2023

## 2023-12-04 ENCOUNTER — HOSPITAL ENCOUNTER (OUTPATIENT)
Dept: BEHAVIORAL HEALTH | Facility: CLINIC | Age: 58
Discharge: HOME | End: 2023-12-04
Attending: PSYCHIATRY & NEUROLOGY
Payer: COMMERCIAL

## 2023-12-04 PROCEDURE — 90853 GROUP PSYCHOTHERAPY: CPT | Performed by: SOCIAL WORKER

## 2023-12-04 PROCEDURE — 90853 GROUP PSYCHOTHERAPY: CPT | Performed by: PSYCHOLOGIST
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
## 2023-12-04 NOTE — GROUP NOTE
Psychoeducation Group Note    PATIENT'S NAME: Jackie Kaplan  MRN:   [id number]  :   1965  ACCT. NUMBER: [id number]  DATE OF SERVICE: 23  START TIME:  2:00 PM  END TIME:  2:50 PM  FACILITATOR: Carisa Yan LICSW; Sandy Campbell RN  TOPIC:  Wellness Group: Health Maintenance  [facility] Outpatient [facility]  TRACK: IOP/DT 2    NUMBER OF PARTICIPANTS: 6    Summary of Group / Topics Discussed:  Health Maintenance: Health Literacy: Patients were provided with education on how to navigate the healthcare system and optimize health outcomes by increasing  health literacy skills and self-management of health. Common medical langage (jargon), abbreviations, terms, and titles were reviewed and discussed. Patients were educated on how to find reliable sources of health information, how to make appointments, ways to prepare for appointments to get the most out of them, and routine health maintenance guidelines for screenings, check-ups, and exams were reviewed.     Patient Session Goals / Objectives:  ?    Identified ways to find reliable health information and methods of evaluating the reliability of health information  ?    Explained how to make appointments, prepare for appointment, and how to get the most out of your appointment  ?    Identified the routine health maintenance guidelines (dentist, physical exams, eye exams) and how those fit into their situation        Patient Participation / Response:  Fully participated with the group by sharing personal reflections / insights and openly received / provided feedback with other participants.    Demonstrated understanding of topics discussed through group discussion and participation and Verbalized understanding of health maintenance topic    Treatment Plan:  Patient has a current master individualized treatment plan.  See Epic treatment plan for more information.    CIERRA Galarza

## 2023-12-04 NOTE — GROUP NOTE
Psychotherapy Group Note    PATIENT'S NAME: Jackie Kaplan  MRN:   [id number]  :   1965  ACCT. NUMBER: [id number]  DATE OF SERVICE: 23  START TIME: 12:00 PM  END TIME: 12:50 PM  FACILITATOR: Alka De La Paz Psy.D, LP  TOPIC:  EBP Group: Coping Skills  [facility] Mental [facility] Outpatient [facility]  TRACK: iop/dt2p  6B    NUMBER OF PARTICIPANTS: 6    Summary of Group / Topics Discussed:  Coping Skills: Self-Soothe: Patients learned to apply self-soothe as a way to decrease heightened stress in the moment.  Patients identified situations that necessitate self-soothe strategies.  They focused on ways to manage physical symptoms of distress using the senses. They discussed how to distinguish when this can be useful in their lives when other strategies are more relevant or helpful.    Patient Session Goals / Objectives:  Understand the purpose of using the senses to decrease distress  Process what happens in the body when using self-soothe strategies  Demonstrate understanding of when to use self-soothe strategies  Identify and problem solve barriers to applying self-soothe strategies.  Choose 1-2 self-soothe strategies to apply during times of distress.      Patient Participation / Response:  Fully participated with the group by sharing personal reflections / insights and openly received / provided feedback with other participants.    Expressed understanding of the relevance / importance of coping skills at distressing times in life and Demonstrated knowledge of when to consider using a variety of coping skills in daily life    Treatment Plan:  Patient has a current master individualized treatment plan.  See Epic treatment plan for more information.    Alka De La Paz Psy.D, ANGUS

## 2023-12-05 ENCOUNTER — HOSPITAL ENCOUNTER (OUTPATIENT)
Dept: BEHAVIORAL HEALTH | Facility: CLINIC | Age: 58
Discharge: HOME | End: 2023-12-05
Attending: PSYCHIATRY & NEUROLOGY
Payer: COMMERCIAL

## 2023-12-05 PROCEDURE — 90853 GROUP PSYCHOTHERAPY: CPT | Performed by: SOCIAL WORKER

## 2023-12-05 PROCEDURE — 90853 GROUP PSYCHOTHERAPY: CPT | Performed by: PSYCHOLOGIST

## 2023-12-05 NOTE — GROUP NOTE
Psychoeducation Group Note    PATIENT'S NAME: Jackie Kaplan  MRN:   [id number]  :   1965  ACCT. NUMBER: [id number]  DATE OF SERVICE: 23  START TIME:  2:00 PM  END TIME:  2:50 PM  FACILITATOR: Carisa Yan LICSW; Genna Patrick OTR  TOPIC:  Life Skills Group: Life Skills  [facility] Outpatient [facility]  TRACK: IOP/DT 2    NUMBER OF PARTICIPANTS: 6    Summary of Group / Topics Discussed:  Life Skills: Provided education and facilitated discussion on self-compassion to combat negative thoughts and improve overall mental wellness. Facilitated a self-reflection process on self-compassion strategies that they are using in their daily life. Explored and discussed different self compassion strategies with peers. Completed skill exploration of self compassion to gain self awareness and identify benefits of skill usage in their daily life. To identify self compassion in their daily roles, patients shared with the group something they are proud of, how they are taking care of themselves, something they are good at, and roles/responsibilities they have in their life. Validation and support provided throughout group process.       Patient Session Goals / Objectives:    Identify current self compassion strategies in their daily life.    Engage in experiential practice of self-compassion reflection to identify benefits and future use.    Identify and reflect on self-compassion strategies they will use in their daily life to improve feelings of self-confidence and to use as a source of coping/resiliency.       Patient Participation / Response:  Fully participated with the group by sharing personal reflections / insights and openly received / provided feedback with other participants.    Verbalized understanding of content    Treatment Plan:  Patient has a current master individualized treatment plan.  See Epic treatment plan for more information.    Carisa Yan,  LICSW

## 2023-12-05 NOTE — GROUP NOTE
"Process Group Note    PATIENT'S NAME: Jackie Kaplan  MRN:   [id number]  :   1965  ACCT. NUMBER: [id number]  DATE OF SERVICE: 23  START TIME:  1:00 PM  END TIME:  1:50 PM  FACILITATOR: Alka De La Paz Psy.D, LP  TOPIC:  Process Group    Diagnoses:  300.02 (F41.1) Generalized Anxiety Disorder.  296.32 (F33.1) Major Depressive Disorder, Recurrent Episode, Moderate _ and With anxious distress  309.9 (F43.9) Unspecified Trauma and Stressor Related Disorder.  5. 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       Per [facility]   PSYCHIATRY DISCHARGE SUMMARY  Admission Date and Time: 10/6/2023 3:11 AM   Discharge Date: 10/8/2023  Discharge Diagnoses   # 1: Bipolar I, manic with psychotic features   # 2: Cannabis use disorder, moderate     Per her report  PTSD                [facility]ry: [facility]        [address]  [place] [address]  Phone: [phone]          Individual Therapist/Name:  [facility] ([facility])  PCP: Mary [facility]al [facility] Out[facility]  TRACK: 6B iop/dt2p    NUMBER OF PARTICIPANTS: 6        Data:    Session content: At the start of this group, patients were invited to check in by identifying themselves, describing their current emotional status, and identifying issues to address in this group.   Area(s) of treatment focus addressed in this session included Symptom Management, Personal Safety, and Community Resources/Discharge Planning.  Client reported being safe today.  Reported mood is \"good.\"    Goal for today is to attend group therapy.  The client talked to the group about how she went to the [employer] shop and was \"decluttering.\"  She reported many flashbacks of past issues and how it was difficult to do. She talked to the group about decreasing her medications and about " flashbacks. 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       Therapeutic Interventions/Treatment Strategies:  Psychotherapist reinforced use of skills. Treatment modalities used include Cognitive Behavioral Therapy and Dialectical Behavioral Therapy. Interventions include Coping Skills: Assisted patient in identifying 1-2 healthy distraction skills to reduce overall distress, Relapse Prevention: Coached on skills to manage factors that contribute to relapse, Mindfulness: Encouraged a plan to use mindfulness skills in daily life, and Symptoms Management: Promoted understanding of their diagnoses and how it impacts their functioning.    Assessment:    Patient response:   Patient responded to session by giving feedback, listening, and focusing on goals    Possible barriers to participation / learning include: severity of symptoms    Health Issues:   None reported       Substance Use Review:   Substance Use: Last use: smokes pot to get to sleep    Mental Status/Behavioral Observations  Appearance:   Appropriate   Eye Contact:   Good   Psychomotor Behavior: Normal   Attitude:   Cooperative   Orientation:   All  Speech   Rate / Production: Normal    Volume:  Normal   Mood:    Anxious  Depressed   Affect:    Constricted   Thought Content:   Rumination and Psychosis denies any symptoms of psychosis  Thought Form:  Coherent  Logical     Insight:    Good     Plan:   Safety Plan: Recommended that patient call 911 or go to the local ED should there be a change in any of these risk factors.   Barriers to treatment: None identified  Patient Contracts (see media tab):  None  Substance Use: Provided encouragement towards sobriety   Continue or Discharge: Patient will continue in Adult Day Treatment (ADT)  as planned. Patient is likely to benefit from learning and using skills as they work toward the goals identified in their treatment plan.      Jimmy Morocho.MIKIE, LP  December 5, 2023

## 2023-12-05 NOTE — GROUP NOTE
Psychotherapy Group Note    PATIENT'S NAME: Jackie Kaplan  MRN:   [id number]  :   1965  ACCT. NUMBER: [id number]  DATE OF SERVICE: 23  START TIME: 12:00 PM  END TIME: 12:50 PM  FACILITATOR: Alka De La Paz Psy.D, LP  TOPIC:  EBP Group: Self-Awareness  [facility] Out[facility]  TRACK: 6B iop/dt2p    NUMBER OF PARTICIPANTS: 6    Summary of Group / Topics Discussed:  Self-Awareness: Grief: Patients were provided with an overview of how personal losses impact their thoughts, feelings, and behaviors. Different stages of grief were discussed, with a focus on the personal and individual experiences of grief as a natural response to loss. The relationship between grief, depression, and anxiety was also discussed. Patients were provided with information regarding different ways of processing grief and shared their personal experiences.     Patient Session Goals / Objectives:  Defined and explored the concept of grief and the grieving process  Discussed relationship between grief, depression, and anxiety   Normalized and recognized the purpose/benefits of the grieving process  Discussed management of the thoughts and feelings associated with grief      Patient Participation / Response:  Fully participated with the group by sharing personal reflections / insights and openly received / provided feedback with other participants.    Demonstrated understanding of values, strengths, and challenges to learn about themselves  The group discussed self-forgiveness and symptoms. They wrote a letter to their symptoms.   Treatment Plan:  Patient has a current master individualized treatment plan.  See Epic treatment plan for more information.    Alka De La Paz Psy.D, ANGUS

## 2023-12-07 ENCOUNTER — HOSPITAL ENCOUNTER (OUTPATIENT)
Dept: BEHAVIORAL HEALTH | Facility: CLINIC | Age: 58
Discharge: HOME | End: 2023-12-07
Attending: PSYCHIATRY & NEUROLOGY
Payer: COMMERCIAL

## 2023-12-07 PROCEDURE — 90853 GROUP PSYCHOTHERAPY: CPT | Performed by: SOCIAL WORKER

## 2023-12-07 PROCEDURE — 90853 GROUP PSYCHOTHERAPY: CPT | Performed by: PSYCHOLOGIST

## 2023-12-07 NOTE — GROUP NOTE
Psychotherapy Group Note    PATIENT'S NAME: Jackie Kaplan  MRN:   [id number]  :   1965  ACCT. NUMBER: [id number]  DATE OF SERVICE: 23  START TIME: 12:00 PM  END TIME: 12:50 PM  FACILITATOR: Alka De La Paz Psy.D, LP  TOPIC: MH EBP Group: Behavioral Activation  [facility] Outpatient [facility]  TRACK: 6B iop/dt2-    NUMBER OF PARTICIPANTS: 6    Summary of Group / Topics Discussed:  Behavioral Activation: The Change Process - Behavior Change: Patients explored the process and types of change, including but not limited to, theories of change, steps to making change, methods of changing behavior, and potential barriers.  Patients worked to identify what changes may benefit their daily lives, and work towards a plan to implement change.      Patient Session Goals / Objectives:  Demonstrate understanding of the change process.    Identify positive and negative behavioral patterns.  Make plans to track and implement changes and share experiences in group.  Identify personal barriers to change      Patient Participation / Response:  Fully participated with the group by sharing personal reflections / insights and openly received / provided feedback with other participants.    Expressed understanding of the relationship between behaviors, thoughts, and feelings    Treatment Plan:  Patient has a current master individualized treatment plan.  See Epic treatment plan for more information.    Alka De La Paz Psy.D, ANGUS

## 2023-12-07 NOTE — GROUP NOTE
Psychoeducation Group Note    PATIENT'S NAME: Jackie Kaplan  MRN:   [id number]  :   1965  ACCT. NUMBER: [id number]  DATE OF SERVICE: 23  START TIME:  2:00 PM  END TIME:  2:50 PM  FACILITATOR: Carisa Yan LICSW; Genna Patrick OTR  TOPIC: MH Life Skills Group: Communication and Social Skills Development  [facility]patient [facility]  TRACK: IO/DT P 2    NUMBER OF PARTICIPANTS: 6    Summary of Group / Topics Discussed:  Communication and Social Skills Development: Social Supports: Communication and Social Skills Development: Patients were provided space and time to reflect on the importance of a support system and whom their support system is. Patients discussed the need for different types of support: emotional, social, problem solving, physical assistance, and material assistance. Patients identified what areas they need extra support in and where they could find this support. Patients created a plan to expand their support system and shared with the group. Validation, support, and feedback were provided throughout group process.      Patient Session Goals / Objectives:    Self-reflected on individual support systems.    Identified specific life areas where they could improve their support system    Established a plan to build support system to support their mental health recovery    Practiced and reflected on how to generalize taught skills to their everyday life       Patient Participation / Response:  Fully participated with the group by sharing personal reflections / insights and openly received / provided feedback with other participants.    Verbalized understanding of content    Treatment Plan:  Patient has a current master individualized treatment plan.  See Epic treatment plan for more information.    CIERRA Galarza

## 2023-12-07 NOTE — GROUP NOTE
"Process Group Note    PATIENT'S NAME: Jackie Kaplan  MRN:   [id number]  :   1965  ACCT. NUMBER: [id number]  DATE OF SERVICE: 23  START TIME:  1:00 PM  END TIME:  1:50 PM  FACILITATOR: Alka De La Paz Psy.D, LP  TOPIC:  Process Group    Diagnoses:  300.02 (F41.1) Generalized Anxiety Disorder.  296.32 (F33.1) Major Depressive Disorder, Recurrent Episode, Moderate _ and With anxious distress  309.9 (F43.9) Unspecified Trauma and Stressor Related Disorder.  5. 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       Per [facility]   PSYCHIATRY DISCHARGE SUMMARY  Admission Date and Time: 10/6/2023 3:11 AM   Discharge Date: 10/8/2023  Discharge Diagnoses   # 1: Bipolar I, manic with psychotic features   # 2: Cannabis use disorder, moderate     Per her report  PTSD                Psychiatry: [facility]        [address]  [place] [address]  Phone: [phone]          Individual Therapist/Name:  [facility] ([facility])  PCP: Mary Carb[facility] Out[facility]  TRACK: 6B iop/dt2p    NUMBER OF PARTICIPANTS: 6        Data:    Session content: At the start of this group, patients were invited to check in by identifying themselves, describing their current emotional status, and identifying issues to address in this group.   Area(s) of treatment focus addressed in this session included Symptom Management, Personal Safety, and Community Resources/Discharge Planning.  Client reported being safe today.  Reported mood is \"good.\"    Goal for today is to attend group therapy. 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 She reported that she feels supported by the group and that she will try to renew and strengthen her relationship with her son. She stated that she " decreased some medications but continues to take levothyroxine and melatonin and is sleeping 7-8 hours each night. She reported no psychosis and smokes some marijuana at night to get to sleep.      Therapeutic Interventions/Treatment Strategies:  Psychotherapist reinforced use of skills. Treatment modalities used include Cognitive Behavioral Therapy and Dialectical Behavioral Therapy. Interventions include Coping Skills: Promoted understanding of how and when to apply grounding strategies to reduce distress and increase presence in the moment, Mindfulness: Facilitated discussion of when/how to use mindfulness skills to benefit general health, mental health symptoms, and stressors, Symptoms Management: Promoted understanding of their diagnoses and how it impacts their functioning, and Emotions Management:  Reviewed opposite action skill.    Assessment:    Patient response:   Patient responded to session by listening, focusing on goals, and being attentive    Possible barriers to participation / learning include: severity of symptoms    Health Issues:   None reported       Substance Use Review:   Substance Use: Last use: smokes some marijuana to get to sleep    Mental Status/Behavioral Observations  Appearance:   Appropriate   Eye Contact:   Good   Psychomotor Behavior: Normal   Attitude:   Cooperative   Orientation:   All  Speech   Rate / Production: Normal    Volume:  Normal   Mood:    Anxious  Normal  Affect:    Constricted   Thought Content:   Rumination and Psychosis denies any symptoms of psychosis  Thought Form:  Coherent  Logical     Insight:    Good     Plan:   Safety Plan: Recommended that patient call 911 or go to the local ED should there be a change in any of these risk factors.   Barriers to treatment: None identified  Patient Contracts (see media tab):  None  Substance Use: Provided encouragement towards sobriety   Continue or Discharge: Patient will continue in Adult Day Treatment (ADT)  as planned.  Patient is likely to benefit from learning and using skills as they work toward the goals identified in their treatment plan.      Jimmy Morocho.MIKIE, LP  December 7, 2023

## 2023-12-11 ENCOUNTER — HOSPITAL ENCOUNTER (OUTPATIENT)
Dept: BEHAVIORAL HEALTH | Facility: CLINIC | Age: 58
Discharge: HOME | End: 2023-12-11
Attending: PSYCHIATRY & NEUROLOGY
Payer: COMMERCIAL

## 2023-12-11 PROCEDURE — 90853 GROUP PSYCHOTHERAPY: CPT | Performed by: SOCIAL WORKER

## 2023-12-11 PROCEDURE — 90853 GROUP PSYCHOTHERAPY: CPT | Performed by: PSYCHOLOGIST

## 2023-12-11 NOTE — GROUP NOTE
"Process Group Note    PATIENT'S NAME: Jackie Kaplan  MRN:   [id number]  :   1965  ACCT. NUMBER: [id number]  DATE OF SERVICE: 23  START TIME:  1:00 PM  END TIME:  1:50 PM  FACILITATOR: Alka De La Paz Psy.D, LP  TOPIC:  Process Group    Diagnoses:  300.02 (F41.1) Generalized Anxiety Disorder.  296.32 (F33.1) Major Depressive Disorder, Recurrent Episode, Moderate _ and With anxious distress  309.9 (F43.9) Unspecified Trauma and Stressor Related Disorder.  5. 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       Per [facility]   PSYCHIATRY DISCHARGE SUMMARY  Admission Date and Time: 10/6/2023 3:11 AM   Discharge Date: 10/8/2023  Discharge Diagnoses   # 1: Bipolar I, manic with psychotic features   # 2: Cannabis use disorder, moderate     Per her report  PTSD                Psychiatry: [facility]        [address]  [place] [address]  Phone: [phone]          Individual Therapist/Name:  [facility] ([facility])  PCP: Mary Carb[facility] Outpatient [facility]  TRACK: 6B iop/dt2p    NUMBER OF PARTICIPANTS: 4        Data:    Session content: At the start of this group, patients were invited to check in by identifying themselves, describing their current emotional status, and identifying issues to address in this group.   Area(s) of treatment focus addressed in this session included Symptom Management, Personal Safety, and Community Resources/Discharge Planning.  Client reported being safe today.  Reported mood is \"good.\"    Goal for today is to attend group therapy. 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 She talked with others about last year at this time when she had problems with " delusions and felt confused. She talked about politics in the past and how the war in [place] fed into her delusions and caused her to feel more confused. She talked with another about thoughts and organization of thoughts.      Therapeutic Interventions/Treatment Strategies:  Psychotherapist reinforced use of skills. Treatment modalities used include Cognitive Behavioral Therapy and Dialectical Behavioral Therapy. Interventions include Coping Skills: Reviewed patients current calming practices and discussed a more formal way of practicing and accessing skills, Mindfulness: Facilitated discussion of when/how to use mindfulness skills to benefit general health, mental health symptoms, and stressors, Symptoms Management: Promoted understanding of their diagnoses and how it impacts their functioning, and Emotions Management:  Discussed barriers to emotional regulation and Reviewed opposite action skill.    Assessment:    Patient response:   Patient responded to session by listening, focusing on goals, and being attentive    Possible barriers to participation / learning include: severity of symptoms    Health Issues:   None reported       Substance Use Review:   Substance Use: Last use: uses marijuana to get to sleep    Mental Status/Behavioral Observations  Appearance:   Appropriate   Eye Contact:   Good   Psychomotor Behavior: Normal   Attitude:   Cooperative   Orientation:   All  Speech   Rate / Production: Normal    Volume:  Normal   Mood:    Depressed   Affect:    Constricted   Thought Content:   Rumination and Psychosis denies any symptoms of psychosis  Thought Form:  Coherent  Logical     Insight:    Good     Plan:   Safety Plan: Recommended that patient call 911 or go to the local ED should there be a change in any of these risk factors.   Barriers to treatment: None identified  Patient Contracts (see media tab):  None  Substance Use: Provided encouragement towards sobriety   Continue or Discharge: Patient will  continue in Adult Day Treatment (ADT)  as planned. Patient is likely to benefit from learning and using skills as they work toward the goals identified in their treatment plan.      Jimmy Morocho.MIKIE, LP  December 11, 2023

## 2023-12-11 NOTE — GROUP NOTE
Psychotherapy Group Note    PATIENT'S NAME: Jackie Kaplan  MRN:   [id number]  :   1965  ACCT. NUMBER: [id number]  DATE OF SERVICE: 23  START TIME: 12:00 PM  END TIME: 12:50 PM  FACILITATOR: Alka De La Paz Psy.D, LP  TOPIC: MH EBP Group: Relationship Skills  [facility] Outpatient [facility]  TRACK: 6B iop/dt2p    NUMBER OF PARTICIPANTS: 4    Summary of Group / Topics Discussed:  Relationship Skills: Boundaries: Patients were provided with a general overview of interpersonal boundaries and how lack of boundaries relates to symptoms and functioning. The purpose is to help patients identify boundary issues and gain awareness and skills to work towards healthier interpersonal boundaries. Current awareness of healthy boundary characteristics and barriers to establishing healthy boundaries were discussed.    Patient Session Goals / Objectives:  Familiarized patients with the concept of interpersonal boundaries and their characteristics  Discussed and practiced strategies to promote healthier interpersonal boundaries  Identified boundary issues and identified plan to improve boundaries      Patient Participation / Response:  Fully participated with the group by sharing personal reflections / insights and openly received / provided feedback with other participants.    Demonstrated understanding of relationship skills and communication skills    Treatment Plan:  Patient has a current master individualized treatment plan.  See Epic treatment plan for more information.    Alka De La Paz Psy.D, ANGUS

## 2023-12-11 NOTE — GROUP NOTE
Psychoeducation Group Note    PATIENT'S NAME: Jackie Kaplan  MRN:   [id number]  :   1965  ACCT. NUMBER: [id number]  DATE OF SERVICE: 23  START TIME:  2:00 PM  END TIME:  2:50 PM  FACILITATOR: Carisa Yan LICSW; Sandy Campbell RN  TOPIC:  Wellness Group: [facility] Adult Mental Health Outpatient Programs  TRACK: IOP/DT 2 (6B)    NUMBER OF PARTICIPANTS: 4    Summary of Group / Topics Discussed:  Foundations of Health: Sleep: Sleep hygiene: Patients explored the connection between sleep and mental illness. Patients learned about how adequate sleep can improve health, productivity, wellness, quality of life, and safety.     Patient Session Goals / Objectives:  Demonstrated understanding of sleep hygiene practices and benefits of sleep  Identified sleep hygiene strategies to utilize   Described the connection between sleep disturbances and mental illness      Patient Participation / Response:  Fully participated with the group by sharing personal reflections / insights and openly received / provided feedback with other participants.    Demonstrated understanding of topics discussed through group discussion and participation and Verbalized understanding of foundations of health topic    Treatment Plan:  Patient has a current master individualized treatment plan.  See Epic treatment plan for more information.    CIERRA Galarza

## 2023-12-12 ENCOUNTER — HOSPITAL ENCOUNTER (OUTPATIENT)
Dept: BEHAVIORAL HEALTH | Facility: CLINIC | Age: 58
Discharge: HOME | End: 2023-12-12
Attending: PSYCHIATRY & NEUROLOGY
Payer: COMMERCIAL

## 2023-12-12 PROCEDURE — 90853 GROUP PSYCHOTHERAPY: CPT | Performed by: SOCIAL WORKER

## 2023-12-12 PROCEDURE — 90853 GROUP PSYCHOTHERAPY: CPT | Performed by: PSYCHOLOGIST

## 2023-12-12 NOTE — GROUP NOTE
"Process Group Note    PATIENT'S NAME: Jackie Kaplan  MRN:   [id number]  :   1965  ACCT. NUMBER: [id number]  DATE OF SERVICE: 23  START TIME:  1:00 PM  END TIME:  1:50 PM  FACILITATOR: Alka De La Paz Psy.D, LP  TOPIC:  Process Group    Diagnoses:  300.02 (F41.1) Generalized Anxiety Disorder.  296.32 (F33.1) Major Depressive Disorder, Recurrent Episode, Moderate _ and With anxious distress  309.9 (F43.9) Unspecified Trauma and Stressor Related Disorder.  5. 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       Per [facility]   PSYCHIATRY DISCHARGE SUMMARY  Admission Date and Time: 10/6/2023 3:11 AM   Discharge Date: 10/8/2023  Discharge Diagnoses   # 1: Bipolar I, manic with psychotic features   # 2: Cannabis use disorder, moderate     Per her report  PTSD                Psychiatry: [facility]        [address]  [place] [address]  Phone: [phone]          Individual Therapist/Name:  [facility] ([facility])  PCP: Mary Carbajal [facility] Adult Mental Health Outpatient Programs  TRACK: DT2   B2    NUMBER OF PARTICIPANTS: 5        Data:    Session content: At the start of this group, patients were invited to check in by identifying themselves, describing their current emotional status, and identifying issues to address in this group.   Area(s) of treatment focus addressed in this session included Symptom Management, Personal Safety, and Community Resources/Discharge Planning.  Client reported being safe today.  Reported mood is \"good.\"   Goal for today is to attend group therapy. The client talked to the group about how she acted differently towards her partner and was more assertive. She talked about being inpatient in [facility] and that she felt she was kidnapped and placed on inpatient. She talked about not " being able to contact her family for over a week and how terrifying it was. She talked with others about being in a terrible state of mind and having delusions.       Therapeutic Interventions/Treatment Strategies:  Psychotherapist reinforced use of skills. Treatment modalities used include Cognitive Behavioral Therapy and Dialectical Behavioral Therapy. Interventions include Coping Skills: Promoted understanding of how and when to apply grounding strategies to reduce distress and increase presence in the moment, Relapse Prevention: Coached on skills to manage factors that contribute to relapse, Mindfulness: Encouraged a plan to use mindfulness skills in daily life, and Symptoms Management: Promoted understanding of their diagnoses and how it impacts their functioning.    Assessment:    Patient response:   Patient responded to session by focusing on goals, being attentive, and accepting support    Possible barriers to participation / learning include: severity of symptoms    Health Issues:   None reported       Substance Use Review:   Substance Use: Last use: smokes marijuana to get to sleep    Mental Status/Behavioral Observations  Appearance:   Appropriate   Eye Contact:   Good   Psychomotor Behavior: Normal   Attitude:   Cooperative   Orientation:   All  Speech   Rate / Production: Normal    Volume:  Normal   Mood:    Anxious  Depressed   Affect:    Constricted   Thought Content:   Rumination and Psychosis denies any symptoms of psychosis  Thought Form:  Coherent  Logical     Insight:    Good     Plan:   Safety Plan: Recommended that patient call 911 or go to the local ED should there be a change in any of these risk factors.   Barriers to treatment: None identified  Patient Contracts (see media tab):  None  Substance Use: Provided encouragement towards sobriety   Continue or Discharge: Patient will continue in Adult Day Treatment (ADT)  as planned. Patient is likely to benefit from learning and using skills as  they work toward the goals identified in their treatment plan.      Alka De La Paz Psy.D, LP  December 12, 2023

## 2023-12-12 NOTE — GROUP NOTE
Psychotherapy Group Note    PATIENT'S NAME: Jackie Kaplan  MRN:   [id number]  :   1965  ACCT. NUMBER: [id number]  DATE OF SERVICE: 23  START TIME: 12:00 PM  END TIME: 12:50 PM  FACILITATOR: Alka De La Paz Psy.D, LP  TOPIC: MH EBP Group: Relationship Skills  [facility] Mental [facility] Outpatient Programs  TRACK: iop/dt2p-  6B    NUMBER OF PARTICIPANTS: 5    Summary of Group / Topics Discussed:  Relationship Skills: Assertive Communication: Patients were provided with a general overview of assertive communication skills and how practicing assertive communication skills will assist patients in developing healthier and more effective relationships. Patients reviewed their current awareness on ability to practice assertive communication, ways to increase assertive communication, and identified/problem solved barriers to assertive communication.     Patient Session Goals / Objectives:  Identified and discussed patient individual challenges with communication  Presented and practiced effective communication skills in session  Assisted patients in implementing more effective communication skills in their relationships      Patient Participation / Response:  Fully participated with the group by sharing personal reflections / insights and openly received / provided feedback with other participants.    Demonstrated understanding of relationship skills and communication skills    Treatment Plan:  Patient has a current master individualized treatment plan.  See Epic treatment plan for more information.    Alka De La Paz Psy.D, LP

## 2023-12-12 NOTE — GROUP NOTE
Psychoeducation Group Note    PATIENT'S NAME: Jackie Kaplan  MRN:   [id number]  :   1965  ACCT. NUMBER: [id number]  DATE OF SERVICE: 23  START TIME:  2:00 PM  END TIME:  2:50 PM  FACILITATOR: Carisa Yan LICSW; Genna Patrick OTR  TOPIC: MH Life Skills Group: Resiliency Development  [facility] Adult [facility] Outpatient [facility]  TRACK: IOP/DT P 2    NUMBER OF PARTICIPANTS: 5    Summary of Group / Topics Discussed:  Resiliency Development:   Self-Regulation Skills: ABCs of Coping:  Reviewed signs of stress including physical changes, behavior changes, and changes to thoughts and emotions.  Provided education on the benefits of developing a robust coping plan to help manage distress and regulate emotions.  Discussed the importance of having easy access to this plan in times of increased symptoms.  Brainstormed with group members to identify several coping skills for each letter of the alphabet in order to develop a list that includes variety and depth.  Prompted patients to identify at least one new skill from the list they are willing to try.       Patient Session Goals / Objectives:   Review this signs and symptoms of stress and establish a need for stress management strategies   Identify benefits of having a robust coping plan to help manage distress and regulate emotions   Develop a list of coping skills to promote self-regulation    Establish a plan for practice of these skills in their own environments      Patient Participation / Response:  Fully participated with the group by sharing personal reflections / insights and openly received / provided feedback with other participants.    Verbalized understanding of content    Treatment Plan:  Patient has a current master individualized treatment plan.  See Epic treatment plan for more information.    CIERRA Galarza

## 2023-12-14 ENCOUNTER — HOSPITAL ENCOUNTER (OUTPATIENT)
Dept: BEHAVIORAL HEALTH | Facility: CLINIC | Age: 58
Discharge: HOME | End: 2023-12-14
Attending: PSYCHIATRY & NEUROLOGY
Payer: COMMERCIAL

## 2023-12-14 PROCEDURE — 90853 GROUP PSYCHOTHERAPY: CPT | Performed by: PSYCHOLOGIST

## 2023-12-14 PROCEDURE — 90853 GROUP PSYCHOTHERAPY: CPT | Performed by: SOCIAL WORKER

## 2023-12-14 NOTE — GROUP NOTE
"Process Group Note    PATIENT'S NAME: Jackie Kaplan  MRN:   [id number]  :   1965  ACCT. NUMBER: [id number]  DATE OF SERVICE: 23  START TIME:  1:00 PM  END TIME:  1:50 PM  FACILITATOR: Alka De La Paz Psy.D, LP  TOPIC:  Process Group    Diagnoses:  300.02 (F41.1) Generalized Anxiety Disorder.  296.32 (F33.1) Major Depressive Disorder, Recurrent Episode, Moderate _ and With anxious distress  309.9 (F43.9) Unspecified Trauma and Stressor Related Disorder.  5. 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       [facility]   PSYCHIATRY DISCHARGE SUMMARY  Admission Date and Time: 10/6/2023 3:11 AM   Discharge Date: 10/8/2023  Discharge Diagnoses   # 1: Bipolar I, manic with psychotic features   # 2: Cannabis use disorder, moderate     Per her report  PTSD                Psychiatry: [facility]        [address]  [place] [address]  Phone: [phone]          Individual Therapist/Name:  [facility] ([facility])  PCP: Mary Carbajal [facility] Adult Mental Health Outpatient [facility]  TRACK: iop/dt2p   6B    NUMBER OF PARTICIPANTS: 5        Data:    Session content: At the start of this group, patients were invited to check in by identifying themselves, describing their current emotional status, and identifying issues to address in this group.   Area(s) of treatment focus addressed in this session included Symptom Management, Personal Safety, and Community Resources/Discharge Planning.  Client reported being safe today.  Reported mood is \"good.     Goal for today is to attend group therapy.  The client talked to the group about how she had relationship problems in the past and has changed her behaviors. She talked with others about relationships and asserting herself. She reported that she and her partner sold a trailer and " she used the money to pay for a car repair.    She talked about her family and about her friends and the differences in their support with others. She denies psychosis.      Therapeutic Interventions/Treatment Strategies:  Psychotherapist reinforced use of skills. Treatment modalities used include Cognitive Behavioral Therapy and Dialectical Behavioral Therapy. Interventions include Relapse Prevention: Facilitated understanding of effective coping skills in response to triggers for substance use, Mindfulness: Facilitated discussion of when/how to use mindfulness skills to benefit general health, mental health symptoms, and stressors, Symptoms Management: Promoted understanding of their diagnoses and how it impacts their functioning, and Emotions Management:  Discussed barriers to emotional regulation.    Assessment:    Patient response:   Patient responded to session by giving feedback, listening, and focusing on goals    Possible barriers to participation / learning include: severity of symptoms    Health Issues:   None reported       Substance Use Review:   Substance Use: No active concerns identified.  She reported that she smokes some marijuana to get to sleep    Mental Status/Behavioral Observations  Appearance:   Appropriate   Eye Contact:   Good   Psychomotor Behavior: Normal   Attitude:   Cooperative   Orientation:   All  Speech   Rate / Production: Normal    Volume:  Normal   Mood:    Anxious  Depressed   Affect:    Constricted   Thought Content:   Rumination and Psychosis denies any symptoms of psychosis  Thought Form:  Coherent  Logical     Insight:    Good     Plan:   Safety Plan: Recommended that patient call 911 or go to the local ED should there be a change in any of these risk factors.   Barriers to treatment: None identified  Patient Contracts (see media tab):  None  Substance Use: Not addressed in session   Continue or Discharge: Patient will continue in Adult Day Treatment (ADT)  as planned.  Patient is likely to benefit from learning and using skills as they work toward the goals identified in their treatment plan.      Jimmy Morocho.MIKIE, LP  December 14, 2023

## 2023-12-14 NOTE — GROUP NOTE
Psychoeducation Group Note    PATIENT'S NAME: Jackie Kaplan  MRN:   [id number]  :   1965  ACCT. NUMBER: [id number]  DATE OF SERVICE: 23  START TIME:  2:00 PM  END TIME:  2:50 PM  FACILITATOR: Carisa Yan LICSW; Genna Patrick OTR  TOPIC:  Life Skills Group: Life Skills  [facility] Adult Mental Health Outpatient Programs  TRACK: IOP/DT 2    NUMBER OF PARTICIPANTS: 4    Summary of Group / Topics Discussed:  Life Skills Clinic: Provided opportunity for patients to independently choose and engage in a therapeutic activity that supports progress towards their goals and psychiatric recovery. Discussed the skill of behavioral activation and acting opposite to emotion to improve motivation in everyday life tasks. The Life Skills Clinic provides a context for patients to monitor their symptoms, gain self-awareness, practice skills (self-regulation, mindfulness, self-talk, focus/concentration, social, productivity), build a sense of self efficacy and mastery, as well as receive validation, support, and resources. Staff checks in, observes, assesses, and monitors performance skills and patterns in context with each group member.        Patient Session Goals / Objectives:    Independently identify a purposeful and meaningful therapeutic activity.    Identified which goal(s) they are intentionally working on during session.     Reflected on their performance and share insight about their progress.    Practiced and identified a way to generalize a skill to their everyday life.       Patient Participation / Response:  Fully participated with the group by sharing personal reflections / insights and openly received / provided feedback with other participants.    Verbalized understanding of content    Treatment Plan:  Patient has a current master individualized treatment plan.  See Epic treatment plan for more information.    CIERRA Galarza

## 2023-12-14 NOTE — GROUP NOTE
Psychotherapy Group Note    PATIENT'S NAME: Jackie Kaplan  MRN:   [id number]  :   1965  ACCT. NUMBER: [id number]  DATE OF SERVICE: 23  START TIME: 12:00 PM  END TIME: 12:50 PM  FACILITATOR: Alka De La Paz Psy.D, LP  TOPIC: MH EBP Group: Relationship Skills  [facility] Outpatient Programs  TRACK: 6B          iop/dt2 p     NUMBER OF PARTICIPANTS: 5    Summary of Group / Topics Discussed:  Relationship Skills: Dependencies: Patients were provided with a general overview of different types of dependencies and how they relate to symptoms and functioning. The purpose is to help patients identify the different types of dependencies, how they may be impacted by them, and to gain awareness and skills to work towards healthier relationships.    Patient Session Goals / Objectives:  Familiarized patients with the concept of interpersonal relationships and their characteristics.    Discussed and practiced strategies to promote healthier understanding of interpersonal relationships with a focus on dependencies  Identified types of dependencies in patient s lives and how they impact their symptoms and functioning      Patient Participation / Response:  Fully participated with the group by sharing personal reflections / insights and openly received / provided feedback with other participants.    Demonstrated understanding of relationship skills and communication skills    Treatment Plan:  Patient has a current master individualized treatment plan.  See Epic treatment plan for more information.    Alka De La Paz Psy.D, ANGUS

## 2023-12-16 ENCOUNTER — HEALTH MAINTENANCE LETTER (OUTPATIENT)
Age: 58
End: 2023-12-16

## 2023-12-18 ENCOUNTER — HOSPITAL ENCOUNTER (OUTPATIENT)
Dept: BEHAVIORAL HEALTH | Facility: CLINIC | Age: 58
Discharge: HOME | End: 2023-12-18
Attending: PSYCHIATRY & NEUROLOGY
Payer: COMMERCIAL

## 2023-12-18 PROCEDURE — 90853 GROUP PSYCHOTHERAPY: CPT | Performed by: SOCIAL WORKER

## 2023-12-18 PROCEDURE — 90853 GROUP PSYCHOTHERAPY: CPT | Performed by: PSYCHOLOGIST

## 2023-12-18 NOTE — GROUP NOTE
"Process Group Note    PATIENT'S NAME: Jackie Kaplan  MRN:   [id number]  :   1965  ACCT. NUMBER: [id number]  DATE OF SERVICE: 23  START TIME:  1:00 PM  END TIME:  1:50 PM  FACILITATOR: Alka De La Paz Psy.D, LP  TOPIC:  Process Group    Diagnoses:  300.02 (F41.1) Generalized Anxiety Disorder.  296.32 (F33.1) Major Depressive Disorder, Recurrent Episode, Moderate _ and With anxious distress  309.9 (F43.9) Unspecified Trauma and Stressor Related Disorder.  5. 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       Per [facility]   PSYCHIATRY DISCHARGE SUMMARY  Admission Date and Time: 10/6/2023 3:11 AM   Discharge Date: 10/8/2023  Discharge Diagnoses   # 1: Bipolar I, manic with psychotic features   # 2: Cannabis use disorder, moderate     Per her report  PTSD                Psychiatry: [facility]        [address]  [place] [address]  Phone: [phone]          Individual Therapist/Name:  [facility] ([facility] [place])  PCP: Mary Carbajal [facility] Outpatient [facility]  TRACK: iop/dt2-p  6B    NUMBER OF PARTICIPANTS: 4        Data:    Session content: At the start of this group, patients were invited to check in by identifying themselves, describing their current emotional status, and identifying issues to address in this group.   Area(s) of treatment focus addressed in this session included Symptom Management, Personal Safety, and Community Resources/Discharge Planning.  Client reported being safe today.  Reported mood is \"good.\"   Goal for today is to attend group therapy. The client talked to the group about how she will buy a [employer] present for her son, and go to [employer] for it. She talked about going off a medication and how she lost 5# since last week. She stated that she has been involved with work " and managing chores at home. She denies delusional thoughts or paranoia. She reported that she will go see some [employer] lights where cars drive through the display, because of the cold weather.      Therapeutic Interventions/Treatment Strategies:  Psychotherapist reinforced use of skills. Treatment modalities used include Cognitive Behavioral Therapy and Dialectical Behavioral Therapy. Interventions include Coping Skills: Reviewed patients current calming practices and discussed a more formal way of practicing and accessing skills, Relapse Prevention: Discussed the use of substances and its impact on their relationships, Mindfulness: Facilitated discussion of when/how to use mindfulness skills to benefit general health, mental health symptoms, and stressors, and Symptoms Management: Promoted understanding of their diagnoses and how it impacts their functioning.    Assessment:    Patient response:   Patient responded to session by listening, being attentive, and accepting support    Possible barriers to participation / learning include: severity of symptoms    Health Issues:   None reported       Substance Use Review:   Substance Use: Last use: smoking some marijuana to get to sleep, and reported using less    Mental Status/Behavioral Observations  Appearance:   Appropriate   Eye Contact:   Good   Psychomotor Behavior: Normal   Attitude:   Cooperative   Orientation:   All  Speech   Rate / Production: Normal    Volume:  Normal   Mood:    Anxious   Affect:    Constricted   Thought Content:   Rumination and Psychosis denies any symptoms of psychosis  Thought Form:  Coherent  Logical     Insight:    Good     Plan:   Safety Plan: Recommended that patient call 911 or go to the local ED should there be a change in any of these risk factors.   Barriers to treatment: None identified  Patient Contracts (see media tab):  None  Substance Use: Provided encouragement towards sobriety   Continue or Discharge: Patient will  continue in Adult Day Treatment (ADT)  as planned. Patient is likely to benefit from learning and using skills as they work toward the goals identified in their treatment plan.      Jimmy Morocho.MIKIE, LP  December 18, 2023

## 2023-12-18 NOTE — GROUP NOTE
Psychotherapy Group Note    PATIENT'S NAME: Jackie Kaplan  MRN:   [id number]  :   1965  ACCT. NUMBER: [id number]  DATE OF SERVICE: 23  START TIME: 12:00 PM  END TIME: 12:50 PM  FACILITATOR: Alka De La Paz Psy.D, LP  TOPIC: MH EBP Group: Self-Awareness  [facility] Mental Health Outpatient Programs  TRACK: iop/dt2p   6B    NUMBER OF PARTICIPANTS: 4    Summary of Group / Topics Discussed:  Self-Awareness: Values: Patients identified personal values by examining development of their current values and how their values influence their daily functioning and life choices. Patients explored the impact of their values on their thoughts, feelings, and actions. Patients discussed definition of personal values and how they develop and change over time. The goal is to help patients reconcile value conflicts and achieve balance and flexibility to improve mood and daily functioning.     Patient Session Goals / Objectives:  Examined development of values and impact of values on functioning  Identified and prioritized important values related to current well-being   Identified strategies to change or enhance values to positively impact symptoms  Assisted patients to find ways to adapt functioning to better fit their values      Patient Participation / Response:  Fully participated with the group by sharing personal reflections / insights and openly received / provided feedback with other participants.    Demonstrated understanding of values, strengths, and challenges to learn about themselves    Treatment Plan:  Patient has a current master individualized treatment plan.  See Epic treatment plan for more information.    Alka De La Paz Psy.D, ANGUS

## 2023-12-18 NOTE — GROUP NOTE
Psychotherapy Group Note    PATIENT'S NAME: Jackie Kaplan  MRN:   [id number]  :   1965  ACCT. NUMBER: [id number]  DATE OF SERVICE: 23  START TIME:  2:00 PM  END TIME:  2:50 PM  FACILITATOR: Carisa Yan LICSW; Sandy Campbell RN  TOPIC: MH EBP Group: Self-Awareness  [facility] Mental Health Outpatient Programs  TRACK: IOP/DT 2    NUMBER OF PARTICIPANTS: 4    Summary of Group / Topics Discussed:  Self-Awareness: Self-Compassion: Patients received overview of key concepts in developing self-compassion. Patients discussed mindfulness, self-kindness, and finding common humanity. Patients identified their current approach to problems in their lives and learned skills for increasing self-compassion.     Patient Session Goals / Objectives:  [place] components of self-compassion  Identify ways to practice self-compassion in daily life  Identify strengths, values and interests.      Patient Participation / Response:  Fully participated with the group by sharing personal reflections / insights and openly received / provided feedback with other participants.    Demonstrated understanding of topics discussed through group discussion and participation, Demonstrated understanding of values, strengths, and challenges to learn about themselves, and Practiced skills in session    Treatment Plan:  Patient has a current master individualized treatment plan.  See Epic treatment plan for more information.    CIERRA Galarza

## 2023-12-19 ENCOUNTER — HOSPITAL ENCOUNTER (OUTPATIENT)
Dept: BEHAVIORAL HEALTH | Facility: CLINIC | Age: 58
Discharge: HOME | End: 2023-12-19
Attending: PSYCHIATRY & NEUROLOGY
Payer: COMMERCIAL

## 2023-12-19 PROCEDURE — 90853 GROUP PSYCHOTHERAPY: CPT | Performed by: PSYCHOLOGIST

## 2023-12-19 PROCEDURE — 90853 GROUP PSYCHOTHERAPY: CPT | Performed by: SOCIAL WORKER

## 2023-12-19 NOTE — GROUP NOTE
Psychoeducation Group Note    PATIENT'S NAME: Jackie Kaplan  MRN:   [id number]  :   1965  ACCT. NUMBER: [id number]  DATE OF SERVICE: 23  START TIME:  2:00 PM  END TIME:  2:50 PM  FACILITATOR: Carisa Yan LICSW; Genna Patrick OTR  TOPIC:  Life Skills Group: Lifestyle Balance and Structure  [facility] Mental Health Outpatient Programs  TRACK: IOP/DT 2 P    NUMBER OF PARTICIPANTS: 5    Summary of Group / Topics Discussed:  Life Balance, Structure, and Routine: Healthy Habits: Provided education on habit making and how to form healthy habits. Facilitated discussion on current habits that patients engage in their everyday life and how it impacts their mental wellness. Patients reflected on strategies of how to maintain habits in their everyday life and identified one habit they would like to improve on. Patients reflected on barriers to achieving their helpful habits in their daily life. Provided an experiential opportunity to create a habit tracker, make goals, or work on a habit that they have identified as meaningful. Validation, support, and feedback were provided throughout group process.       Patient Session Goals / Objectives:   Understand how habits form and strategies to maintain habits   Identify healthy habits they would like to improve on   Explore different habit strategies and trackers   Establish a plan for practice of habits in their own environments       Patient Participation / Response:  Fully participated with the group by sharing personal reflections / insights and openly received / provided feedback with other participants.    Verbalized understanding of content    Treatment Plan:  Patient has a current master individualized treatment plan.  See Epic treatment plan for more information.    CIERRA Galarza

## 2023-12-19 NOTE — GROUP NOTE
"Process Group Note    PATIENT'S NAME: Jackie Kaplan  MRN:   [id number]  :   1965  ACCT. NUMBER: [id number]  DATE OF SERVICE: 23  START TIME:  1:00 PM  END TIME:  1:50 PM  FACILITATOR: Alka De La Paz Psy.D, LP  TOPIC:  Process Group    Diagnoses:  300.02 (F41.1) Generalized Anxiety Disorder.  296.32 (F33.1) Major Depressive Disorder, Recurrent Episode, Moderate _ and With anxious distress  309.9 (F43.9) Unspecified Trauma and Stressor Related Disorder.  5. 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       Per [facility]   PSYCHIATRY DISCHARGE SUMMARY  Admission Date and Time: 10/6/2023 3:11 AM   Discharge Date: 10/8/2023  Discharge Diagnoses   # 1: Bipolar I, manic with psychotic features   # 2: Cannabis use disorder, moderate     Per her report  PTSD                [facility]: [facility]        [address]  [place] [address]  Phone: [phone]          Individual Therapist/Name:  [facility] ([place] location)  PCP: Mary Carbajal [facility] Adult Mental Health Outpatient Programs  TRACK: iop/dt2p    NUMBER OF PARTICIPANTS: 5        Data:    Session content: At the start of this group, patients were invited to check in by identifying themselves, describing their current emotional status, and identifying issues to address in this group.   Area(s) of treatment focus addressed in this session included Symptom Management, Personal Safety, and Community Resources/Discharge Planning.  Client reported being safe today.  Reported mood is \"good, ok.\"   Goal for today is to attend group therapy. The client talked to the group about how she drives her and is proud that she does it. She talked about her work and being busy with it and told the group about their upcoming events. She stated that she has cut down on smoking the " "marijuana and takes the levothyroxine and feels much better. She reported that she has determination and is trying to be more assertive with her \"wants and needs.\"  She reported that she made jewelry and showed it to others. She denies psychosis symptoms and talked with others about managing the special messages by turning off the TV and other electronics.       Therapeutic Interventions/Treatment Strategies:  Psychotherapist reinforced use of skills. Treatment modalities used include Cognitive Behavioral Therapy and Dialectical Behavioral Therapy. Interventions include Coping Skills: Assisted patient in identifying 1-2 healthy distraction skills to reduce overall distress, Relapse Prevention: Facilitated understanding the importance of awareness of factors that contribute to relapse , Mindfulness: Facilitated discussion of when/how to use mindfulness skills to benefit general health, mental health symptoms, and stressors, and Symptoms Management: Promoted understanding of their diagnoses and how it impacts their functioning.    Assessment:    Patient response:   Patient responded to session by giving feedback, listening, and focusing on goals    Possible barriers to participation / learning include: severity of symptoms    Health Issues:   None reported       Substance Use Review:   Substance Use: No active concerns identified.    Mental Status/Behavioral Observations  Appearance:   Appropriate   Eye Contact:   Good   Psychomotor Behavior: Normal   Attitude:   Cooperative   Orientation:   All  Speech   Rate / Production: Normal    Volume:  Normal   Mood:    Depressed   Affect:    Constricted   Thought Content:   Rumination and Psychosis reports delusions, preservative thoughts, and referential thinking  Thought Form:  Coherent  Logical  Psychosis    Insight:    Good     Plan:   Safety Plan: Recommended that patient call 911 or go to the local ED should there be a change in any of these risk factors.   Barriers to " treatment: None identified  Patient Contracts (see media tab):  None  Substance Use: Not addressed in session   Continue or Discharge: Patient will continue in Adult Day Treatment (ADT)  as planned. Patient is likely to benefit from learning and using skills as they work toward the goals identified in their treatment plan.      Jimym Morocho.MIKIE, LP  December 19, 2023

## 2023-12-19 NOTE — GROUP NOTE
Psychotherapy Group Note    PATIENT'S NAME: Jackie Kaplan  MRN:   [id number]  :   1965  ACCT. NUMBER: [id number]  DATE OF SERVICE: 23  START TIME: 12:00 PM  END TIME: 12:50 PM  FACILITATOR: Alka De La Paz Psy.D, LP  TOPIC:  EBP Group: Self-Awareness  [facility] Mental Health Outpatient Programs  TRACK: iop/dt2p  6B    NUMBER OF PARTICIPANTS: 5    Summary of Group / Topics Discussed:  Self-Awareness: Personal Strengths: Topic focused on assisting patients in identifying personal strengths and how they relate to the management of mental health symptoms. Patients discussed the benefits of acknowledging their personal strengths and their impact on mood improvement, mindfulness, and perspective. Patients worked to increase time spent on recognition and appreciation of what is positive and working in their lives. The goal is to reduce rumination and negative thinking resulting in increased mindfulness and resilience. Patients will work to put skills into practice and problem-solve barriers.     Patient Session Goals / Objectives:  Identified personal strengths  Identified barriers to recognition of personal strengths  Verbalized understanding of strategies to increase use of their strengths in management of daily symptoms      Patient Participation / Response:  Fully participated with the group by sharing personal reflections / insights and openly received / provided feedback with other participants.    Demonstrated understanding of values, strengths, and challenges to learn about themselves    Treatment Plan:  Patient has a current master individualized treatment plan.  See Epic treatment plan for more information.    Alka De La Paz Psy.D, ANGUS

## 2023-12-21 ENCOUNTER — HOSPITAL ENCOUNTER (OUTPATIENT)
Dept: BEHAVIORAL HEALTH | Facility: CLINIC | Age: 58
Discharge: HOME | End: 2023-12-21
Attending: PSYCHIATRY & NEUROLOGY
Payer: COMMERCIAL

## 2023-12-21 PROCEDURE — 90853 GROUP PSYCHOTHERAPY: CPT | Performed by: SOCIAL WORKER

## 2023-12-21 PROCEDURE — 90853 GROUP PSYCHOTHERAPY: CPT | Performed by: PSYCHOLOGIST

## 2023-12-21 NOTE — GROUP NOTE
"Process Group Note    PATIENT'S NAME: Jackie Kaplan  MRN:   [id number]  :   1965  ACCT. NUMBER: [id number]  DATE OF SERVICE: 23  START TIME:  1:00 PM  END TIME:  1:50 PM  FACILITATOR: Alka De La Paz Psy.D, LP  TOPIC:  Process Group    Diagnoses:      [facility] Adult Mental Health Outpatient Programs  TRACK: iop/dt2p  6B    NUMBER OF PARTICIPANTS: 5        Data:300.02 (F41.1) Generalized Anxiety Disorder.  296.32 (F33.1) Major Depressive Disorder, Recurrent Episode, Moderate _ and With anxious distress  309.9 (F43.9) Unspecified Trauma and Stressor Related Disorder.  5. 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       Per [facility]   PSYCHIATRY DISCHARGE SUMMARY  Admission Date and Time: 10/6/2023 3:11 AM   Discharge Date: 10/8/2023  Discharge Diagnoses   # 1: Bipolar I, manic with psychotic features   # 2: Cannabis use disorder, moderate     Per her report  PTSD                Psychiatry: [facility]        [address]  [place] [address]  Phone: [phone]          Individual Therapist/Name:  [facility] ([facility])  PCP: Mary Carbajal    Session content: At the start of this group, patients were invited to check in by identifying themselves, describing their current emotional status, and identifying issues to address in this group.   Area(s) of treatment focus addressed in this session included Symptom Management, Personal Safety, and Community Resources/Discharge Planning.  Client reported being safe today.  Reported mood is \"good.\"    Goal for today is to attend the group therapy. She reported that she is organizing her home and looking at apartments to be her \"forever home.\"  She talked with her partner about vacations and that she really wanted to spend the money on the \"forever home\" rather than a vacation. " "She reported feeling better since going off of some medications and does take her thyroid medication. She talked about sleeping 7 hours a night and used to sleep 9-10 and the group suggested it may be related to the thyroid.       Therapeutic Interventions/Treatment Strategies:  Psychotherapist reinforced use of skills. Treatment modalities used include Cognitive Behavioral Therapy and Dialectical Behavioral Therapy. Interventions include Coping Skills: Discussed how the use of intentional \"in the moment\" actions can help reduce distress, Relapse Prevention: Assisted patient in identifying personal vulnerabilities, thoughts, emotions, and situations that may lead to relapse , Mindfulness: Encouraged a plan to use mindfulness skills in daily life, and Symptoms Management: Promoted understanding of their diagnoses and how it impacts their functioning.    Assessment:    Patient response:   Patient responded to session by giving feedback, listening, and focusing on goals    Possible barriers to participation / learning include: severity of symptoms    Health Issues:   None reported       Substance Use Review:   Substance Use: Last use: she smokes some pot to get to sleep and has cut down on it.    Mental Status/Behavioral Observations  Appearance:   Appropriate   Eye Contact:   Good   Psychomotor Behavior: Normal   Attitude:   Cooperative   Orientation:   All  Speech   Rate / Production: Normal    Volume:  Normal   Mood:    Normal  Affect:    Appropriate   Thought Content:   Clear  Thought Form:  Coherent  Logical     Insight:    Good     Plan:   Safety Plan: Recommended that patient call 911 or go to the local ED should there be a change in any of these risk factors.   Barriers to treatment: None identified  Patient Contracts (see media tab):  None  Substance Use: Provided encouragement towards sobriety   Continue or Discharge: Patient will continue in Adult Day Treatment (ADT)  as planned. Patient is likely to benefit " from learning and using skills as they work toward the goals identified in their treatment plan.      Alka De La Paz Psy.D, LP  December 21, 2023

## 2023-12-21 NOTE — GROUP NOTE
Psychotherapy Group Note    PATIENT'S NAME: Jackie Kaplan  MRN:   [id number]  :   1965  ACCT. NUMBER: [id number]  DATE OF SERVICE: 23  START TIME: 12:00 PM  END TIME: 12:50 PM  FACILITATOR: Alka De La Paz Psy.D, LP  TOPIC: MH EBP Group: Coping Skills  [facility] Mental Health Outpatient Programs  TRACK: iop/dt2p   6B    NUMBER OF PARTICIPANTS: 5    Summary of Group / Topics Discussed:  Coping Skills: Relapse Planning: Patients discussed and increased understanding of how anticipating and planning for possible increased symptoms is a proactive way to reduce the likelihood of relapse.  Patients shared individualized factors that may lead to increased symptoms and decompensation in functioning.  Each patient developed a relapse prevention plan designed to help them recognize when they may have increasing symptoms requiring them to take action and notify their key supports and care team.      Patient Session Goals / Objectives:  Identify and understand what factors may contribute to symptom relapse.  Identify actions that may be taken to manage increased symptoms/stressors.  Create an individualized written relapse plan  Problem solve barriers to plan creation and implementation  Share relapse plan with key support people      Patient Participation / Response:  Fully participated with the group by sharing personal reflections / insights and openly received / provided feedback with other participants.    Expressed understanding of the relevance / importance of coping skills at distressing times in life    Treatment Plan:  Patient has a current master individualized treatment plan.  See Epic treatment plan for more information.    Alka De La Paz Psy.D, ANGUS

## 2023-12-21 NOTE — GROUP NOTE
Psychoeducation Group Note    PATIENT'S NAME: Jackie Kaplan  MRN:   [id number]  :   1965  ACCT. NUMBER: [id number]  DATE OF SERVICE: 23  START TIME:  2:00 PM  END TIME:  2:50 PM  FACILITATOR: Carisa Yan, CIERRA; Genna Patrick OTR  TOPIC: MH Life Skills Group: Lifestyle Balance and Structure  [facility] Mental Health Outpatient Programs  TRACK: IOP/DT 2     NUMBER OF PARTICIPANTS: 5    Summary of Group / Topics Discussed:  Lifestyle Balance and Strucure:  Routines, Habits, Rituals, and Roles:  Wellness    Provided education on wellness as an active process to reach a state of health and fulfillment. Facilitated discussion on the holistic understanding of wellness (physical, emotional, social, spiritual, mental, and environmental) and talked collaboratively about their personal ideas of wellness. Facilitated a self-reflective process where patients identified wellness values and goals through a creative expression task. Patients shared with the group their wellness vision board and reasoning behind their wellness visions. Validation and support provided throughout group process.       Patient Session Goals / Objectives:    Discuss the topic of wellness in a collaborative, supportive manner    Identify personal wellness motives and goals    Engage in an experiential intervention to explore wellness in an individualized manner     Reflect on the process and share insight around their engagement in the creative expression task Practiced and reflected on how to generalize taught skills to their everyday life      Patient Participation / Response:  Fully participated with the group by sharing personal reflections / insights and openly received / provided feedback with other participants.    Verbalized understanding of content    Treatment Plan:  Patient has a current master individualized treatment plan.  See Epic treatment plan for more information.    Carisa Yan,  LICSW

## 2023-12-26 ENCOUNTER — HOSPITAL ENCOUNTER (OUTPATIENT)
Dept: BEHAVIORAL HEALTH | Facility: CLINIC | Age: 58
Discharge: HOME | End: 2023-12-26
Attending: PSYCHIATRY & NEUROLOGY
Payer: COMMERCIAL

## 2023-12-26 PROCEDURE — 90853 GROUP PSYCHOTHERAPY: CPT

## 2023-12-26 PROCEDURE — 90853 GROUP PSYCHOTHERAPY: CPT | Performed by: PSYCHOLOGIST

## 2023-12-26 NOTE — GROUP NOTE
Psychotherapy Group Note    PATIENT'S NAME: Jackie Kaplan  MRN:   [id number]  :   1965  ACCT. NUMBER: [id number]  DATE OF SERVICE: 23  START TIME: 12:00 PM  END TIME: 12:50 PM  FACILITATOR: Alka De La Paz Psy.D, LP  TOPIC:  EBP Group: Emotions Management  [facility]  TRACK: iop/dt2p    NUMBER OF PARTICIPANTS: 4    Summary of Group / Topics Discussed:  Emotions Management: Understanding Emotions: Patients discussed the purpose of emotions and function they serve in our lives.  Reviewed core emotions, why they happen (triggers), and how they occur. The group assisted one anothers' understanding that: emotional experiences are important; difficult emotions have a place in our lives; and the differences between various emotions.    Patient Session Goals / Objectives:  Demonstrate understanding of types various emotions  Identify and discuss specific emotions and when they occur; understand triggers  Discuss barriers to emotional regulation      Patient Participation / Response:  Fully participated with the group by sharing personal reflections / insights and openly received / provided feedback with other participants.    Expressed understanding of the relevance / importance of emotions management skills at distressing times in life    Treatment Plan:  Patient has a current master individualized treatment plan.  See Epic treatment plan for more information.    Alka De La Paz Psy.D, LP

## 2023-12-26 NOTE — GROUP NOTE
"Psychoeducation Group Note    PATIENT'S NAME: Jackie Kaplan  MRN:   [id number]  :   1965  ACCT. NUMBER: [id number]  DATE OF SERVICE: 23  START TIME:  2:00 PM  END TIME:  2:50 PM  FACILITATOR: Neda Sanabria LGSW  TOPIC: MH Life Skills Group: Resiliency Development  [facility]  TRACK: IOP/DT 2 P    NUMBER OF PARTICIPANTS: 5    Summary of Group / Topics Discussed:  Resiliency Development: Facilitated discussion on the importance of self-compassion and positive self-talk as a coping skill and resiliency factor. Patients identified something they are proud of, grateful for, someone they can depend on, and a positive self affirmation. Patients participated in a creative expression task with peers to create a self-affirmation project that can be a visual reminder of self compassion and hope throughout their daily life. Validation, support, and feedback provided throughout group.     Patient Session Goals / Objectives:   Self reflect on the importance of self compassion   Identify positive affirmations in their daily life independently   Engage with other peers for experiential creativity and expression   Practiced and reflected on how to generalize self compassion skills in their everyday life      Patient Participation / Response:  Fully participated with the group by sharing personal reflections / insights and openly received / provided feedback with other participants.    Demonstrated understanding of content through participating in today's \"Rays of Hope\" self-compassion exercise  and Verbalized understanding of content    Treatment Plan:  Patient has a current master individualized treatment plan.  See Epic treatment plan for more information.    ELEONORA Gasca    "

## 2023-12-26 NOTE — GROUP NOTE
"Process Group Note    PATIENT'S NAME: Jackie Kaplan  MRN:   [id number]  :   1965  ACCT. NUMBER: [id number]  DATE OF SERVICE: 23  START TIME:  1:00 PM  END TIME:  1:50 PM  FACILITATOR: Alka De La Paz Psy.D, LP  TOPIC:  Process Group    Diagnoses:  300.02 (F41.1) Generalized Anxiety Disorder.  296.32 (F33.1) Major Depressive Disorder, Recurrent Episode, Moderate _ and With anxious distress  309.9 (F43.9) Unspecified Trauma and Stressor Related Disorder.  5. 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       [facility]   PSYCHIATRY DISCHARGE SUMMARY  Admission Date and Time: 10/6/2023 3:11 AM   Discharge Date: 10/8/2023  Discharge Diagnoses   # 1: Bipolar I, manic with psychotic features   # 2: Cannabis use disorder, moderate     Per her report  PTSD                Psychiatry: [facility]        [address]  [place] [address]  Phone: [phone]          Individual Therapist/Name:  [facility] ([facility])  PCP: Mary Carbajal [facility] Adult [facility] Outpatient [facility]  TRACK: iop/dt2p    NUMBER OF PARTICIPANTS: 5        Data:    Session content: At the start of this group, patients were invited to check in by identifying themselves, describing their current emotional status, and identifying issues to address in this group.   Area(s) of treatment focus addressed in this session included Symptom Management, Personal Safety, and Community Resources/Discharge Planning.      Therapeutic Interventions/Treatment Strategies:  Psychotherapist reinforced use of skills. Treatment modalities used include Cognitive Behavioral Therapy and Dialectical Behavioral Therapy. Interventions include Coping Skills: Discussed how the use of intentional \"in the moment\" actions can help reduce distress, Relapse Prevention: Assisted " patient in identifying personal vulnerabilities, thoughts, emotions, and situations that may lead to relapse , Mindfulness: Encouraged a plan to use mindfulness skills in daily life, and Symptoms Management: Promoted understanding of their diagnoses and how it impacts their functioning.    Assessment:    Patient response:   Patient responded to session by giving feedback, listening, and focusing on goals    Possible barriers to participation / learning include: severity of symptoms    Health Issues:   None reported       Substance Use Review:   Substance Use: Last use: smokes marijuana to get to sleep, reported that she is smoking less    Mental Status/Behavioral Observations  Appearance:   Appropriate   Eye Contact:   Good   Psychomotor Behavior: Normal   Attitude:   Cooperative   Orientation:   All  Speech   Rate / Production: Normal    Volume:  Normal   Mood:    Anxious   Affect:    Constricted   Thought Content:   Rumination and Psychosis reports preservative thoughts  Thought Form:  Coherent  Logical     Insight:    Good     Plan:   Safety Plan: Recommended that patient call 911 or go to the local ED should there be a change in any of these risk factors.   Barriers to treatment: None identified  Patient Contracts (see media tab):  None  Substance Use: Provided encouragement towards sobriety   Continue or Discharge: Patient will continue in Adult Day Treatment (ADT)  as planned. Patient is likely to benefit from learning and using skills as they work toward the goals identified in their treatment plan.      Jimmy Morocho.MIKIE, LP  December 26, 2023

## 2023-12-28 ENCOUNTER — HOSPITAL ENCOUNTER (OUTPATIENT)
Dept: BEHAVIORAL HEALTH | Facility: CLINIC | Age: 58
Discharge: HOME | End: 2023-12-28
Attending: PSYCHIATRY & NEUROLOGY
Payer: COMMERCIAL

## 2023-12-28 PROCEDURE — 90853 GROUP PSYCHOTHERAPY: CPT | Performed by: COUNSELOR

## 2023-12-28 PROCEDURE — 90853 GROUP PSYCHOTHERAPY: CPT | Performed by: SOCIAL WORKER

## 2023-12-28 PROCEDURE — 90853 GROUP PSYCHOTHERAPY: CPT

## 2023-12-28 NOTE — GROUP NOTE
Process Group Note    PATIENT'S NAME: Jackie Kaplan  MRN:   [id number]  :   1965  ACCT. NUMBER: [id number]  DATE OF SERVICE: 23  START TIME:  1:00 PM  END TIME:  1:50 PM  FACILITATOR: Joyce Haines LICSW  TOPIC:  Process Group    Diagnoses:  300.02 (F41.1) Generalized Anxiety Disorder.  296.32 (F33.1) Major Depressive Disorder, Recurrent Episode, Moderate _ and With anxious distress  309.9 (F43.9) Unspecified Trauma and Stressor Related Disorder.  5. 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      [facility] Adult [facility] Outpatient Programs  TRACK: [facility]/dt2    NUMBER OF PARTICIPANTS: 5        Data:    Session content: At the start of this group, patients were invited to check in by identifying themselves, describing their current emotional status, and identifying issues to address in this group.   Area(s) of treatment focus addressed in this session included Symptom Management and Personal Safety.  Polina states has goals around relaxing an self soothing today.  Preparing for [place] weekend and working.  Declined to take time and was engaged in group conversation though in need of redirection at times    Therapeutic Interventions/Treatment Strategies:  Psychotherapist offered support, feedback and validation and reinforced use of skills. Treatment modalities used include Cognitive Behavioral Therapy and Dialectical Behavioral Therapy.    Assessment:    Patient response:   Patient responded to session by accepting feedback, giving feedback, listening, focusing on goals, being attentive, accepting support, appearing alert, and verbalizing understanding    Possible barriers to participation / learning include: and no barriers identified    Health Issues:   None reported       Substance Use Review:   Substance Use: No active concerns identified.    Mental Status/Behavioral  Observations  Appearance:   Appropriate   Eye Contact:   Good   Psychomotor Behavior: Normal   Attitude:   Cooperative   Orientation:   All  Speech   Rate / Production: Normal    Volume:  Normal   Mood:    Normal  Affect:    Appropriate  Blunted   Thought Content:   Clear  Thought Form:  Coherent  Logical  Tangential     Insight:    Good  and Fair     Plan:   Safety Plan: No current safety concerns identified.  Recommended that patient call 911 or go to the local ED should there be a change in any of these risk factors.   Barriers to treatment: None identified  Patient Contracts (see media tab):  None  Substance Use: Not addressed in session   Continue or Discharge: Patient will continue in Adult Day Treatment (ADT)  as planned. Patient is likely to benefit from learning and using skills as they work toward the goals identified in their treatment plan.      Joyce Haines, [facility]  December 28, 2023

## 2023-12-28 NOTE — GROUP NOTE
Psychoeducation Group Note    PATIENT'S NAME: Jackie Kaplan  MRN:   [id number]  :   1965  ACCT. NUMBER: [id number]  DATE OF SERVICE: 23  START TIME: 12:00 PM  END TIME: 12:50 PM  FACILITATOR: Varsha Rodrigues LPCC  TOPIC: MH Wellness Group: Health Maintenance  [facility] Adult Mental Health Outpatient Programs  TRACK: IOP/DT2    NUMBER OF PARTICIPANTS: 5    Summary of Group / Topics Discussed:  Health Maintenance: Weekend planning: Patients were given time to complete a weekend plan of what they will do to promote wellness and sobriety over the weekend when they do not have the structure of group. Patients were encouraged to review progress on their treatment goals and were challenged to identify ways to work toward meeting them. Patients identified and discussed foreseeable barriers to success over the weekend and then developed a plan to overcome them. Patients reviewed their distress coping skills and social support network and discussed this with the group.       Patient Session Goals / Objectives:    ?    Identified activities to engage in that promote balance in wellness  ?    Distinguished possible barriers to success over the weekend and created a plan to overcome them  ?    Listed distress coping skills and identified social support network to utilize if in crisis during the weekend        Patient Participation / Response:  Fully participated with the group by sharing personal reflections / insights and openly received / provided feedback with other participants.    Demonstrated understanding of topics discussed through group discussion and participation, Identified / Expressed personal readiness to practice skills, and Verbalized understanding of health maintenance topic    Treatment Plan:  Patient has a current master individualized treatment plan.  See Epic treatment plan for more information.    YARED Chavez

## 2023-12-28 NOTE — GROUP NOTE
Psychoeducation Group Note    PATIENT'S NAME: Jackie Kaplan  MRN:   [id number]  :   1965  ACCT. NUMBER: [id number]  DATE OF SERVICE: 23  START TIME:  2:00 PM  END TIME:  2:50 PM  FACILITATOR: Luciana Whiteside LICSW; Genna Patrick OTR  TOPIC: MH Life Skills Group: Sensory Approaches in Mental Health  [facility]  TRACK: IOP DT 2 (P)    NUMBER OF PARTICIPANTS: 5    Summary of Group / Topics Discussed:  Sensory Approaches in Mental Health:  Self Regulation Through Sensory Modulation:  Patients were provided with education on Autonomic Nervous System activation and taught skills that can be used immediately to help them calm down and regain self control.  Patients were taught how to recognize specific signs and symptoms of their individualized state of arousal and how to make changes when needed.  Patients explored body based skills (bottom up processing skills) and techniques to help manage symptoms and change level of arousal when needed to be in control and comfortable so they are able to function in different environments.       Patient Session Goals / Objectives:  Identified specific and individualized neurosensory skills to help when distressed and for crisis management  Identified signs and symptoms of current level of arousal and ways to make changes in level of arousal when needed  Established a plan for practice of these skills in their own environments      Patient Participation / Response:  Fully participated with the group by sharing personal reflections / insights and openly received / provided feedback with other participants.    Demonstrated understanding of content through active participation in discussion and completing the worksheet.      Treatment Plan:  Patient has a current master individualized treatment plan.  See Epic treatment plan for more information.    CIERRA Stevens

## 2024-01-02 ENCOUNTER — HOSPITAL ENCOUNTER (OUTPATIENT)
Dept: BEHAVIORAL HEALTH | Facility: CLINIC | Age: 59
Discharge: HOME | End: 2024-01-02
Attending: PSYCHIATRY & NEUROLOGY
Payer: COMMERCIAL

## 2024-01-02 PROCEDURE — 90853 GROUP PSYCHOTHERAPY: CPT | Performed by: PSYCHOLOGIST

## 2024-01-02 PROCEDURE — 90853 GROUP PSYCHOTHERAPY: CPT | Performed by: SOCIAL WORKER

## 2024-01-02 NOTE — GROUP NOTE
Psychoeducation Group Note    PATIENT'S NAME: Jackie Kaplan  MRN:   [id number]  :   1965  ACCT. NUMBER: [id number]  DATE OF SERVICE: 24  START TIME:  2:00 PM  END TIME:  2:50 PM  FACILITATOR: Carisa Yan LICSW; Genna Patrick OTR  TOPIC: MH Life Skills Group: Lifestyle Balance and Structure  [facility] Adult [facility] Outpatient [facility]  TRACK: IOP 2    NUMBER OF PARTICIPANTS: 6    Summary of Group / Topics Discussed:  Lifestyle Balance and Strucure:  Benefits of Leisure on Mental Health: Patients explored and learned about the benefits and possibilities of leisure activity to create lifestyle balance that supports their mental and physical wellbeing.  Patients were assisted to identify individualized leisure values and interests, recognized the benefits of leisure activity on mental health, and problem solved barriers to leisure engagement and strategies to overcome.  Patient engaged in an experiential leisure activity to gain self-awareness and build milieu social aspects and reflected on the impact the experiential activity had on their mood.       Patient Session Goals / Objectives:  Increased awareness of the importance of engagement in leisure activities to support lifestyle balance and perceived quality of life  Identified strategies to recognize and challenge barriers to leisure participation   Facilitated exploration of meaningful leisure interests and values  Practiced and reflected on how to generalize taught skills to their everyday life      Patient Participation / Response:  Fully participated with the group by sharing personal reflections / insights and openly received / provided feedback with other participants.    Verbalized understanding of content    Treatment Plan:  Patient has a current master individualized treatment plan.  See Epic treatment plan for more information.    CIERRA Galarza

## 2024-01-02 NOTE — GROUP NOTE
Psychotherapy Group Note    PATIENT'S NAME: Jackie Kaplan  MRN:   [id number]  :   1965  ACCT. NUMBER: [id number]  DATE OF SERVICE: 24  START TIME: 12:00 PM  END TIME: 12:50 PM  FACILITATOR: Alka De La Paz Psy.D, LP  TOPIC: MH EBP Group: Cognitive Restructuring  [facility] Out[facility]  TRACK: iop/dt2p  6B    NUMBER OF PARTICIPANTS: 6    Summary of Group / Topics Discussed:  Cognitive Restructuring: Core Beliefs: Patients received an overview of what a core belief is, and how they develop. Patients then began to identify their negative core beliefs. Patients worked to modify core beliefs with the goal of improved self-image and functioning.     Patient Session Goals / Objectives:  Familiarize self with the concept of core beliefs and how they develop.    Explore personal core beliefs (positive and negative)  Develop / advance recognition of the connection between negative thoughts and negative core beliefs.  Formulate new neutral/positive core beliefs             Patient Participation / Response:  Fully participated with the group by sharing personal reflections / insights and openly received / provided feedback with other participants.    Expressed understanding of the relationship between behaviors, thoughts, and feelings    Treatment Plan:  Patient has a current master individualized treatment plan.  See Epic treatment plan for more information.    Alka De La Paz Psy.D, ANGUS

## 2024-01-02 NOTE — GROUP NOTE
"Process Group Note    PATIENT'S NAME: Jackie Kaplan  MRN:   [id number]  :   1965  ACCT. NUMBER: [id number]  DATE OF SERVICE: 24  START TIME:  1:00 PM  END TIME:  1:50 PM  FACILITATOR: Alka De La Paz Psy.D, LP  TOPIC:  Process Group    Diagnoses:  300.02 (F41.1) Generalized Anxiety Disorder.  296.32 (F33.1) Major Depressive Disorder, Recurrent Episode, Moderate _ and With anxious distress  309.9 (F43.9) Unspecified Trauma and Stressor Related Disorder.  5. 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       Per [facility]   PSYCHIATRY DISCHARGE SUMMARY  Admission Date and Time: 10/6/2023 3:11 AM   Discharge Date: 10/8/2023  Discharge Diagnoses   # 1: Bipolar I, manic with psychotic features   # 2: Cannabis use disorder, moderate     Per her report  PTSD                Psychiatry: [facility]        [address]  [place] [address]  Phone: [phone]          Individual Therapist/Name:  [facility] ([facility])  PCP: Mary Carbajal [facility] Adult Mental [facility] Outpatient [facility]  TRACK: iop/dt2p    NUMBER OF PARTICIPANTS: 6        Data:    Session content: At the start of this group, patients were invited to check in by identifying themselves, describing their current emotional status, and identifying issues to address in this group.   Area(s) of treatment focus addressed in this session included Symptom Management, Personal Safety, and Community Resources/Discharge Planning.  Client reported being safe today.  Reported mood is \"tired.\"   Goal for today is to attend group therapy. The client talked to the group about how she left early from work and went home because it was amateur night and didn't need to stay. She reported that she has cut down on use of marijuana and is taking her thyroid medicine. She reported that " the dose has been lowered and this is done periodically and then it will be increased to the regular dose. She stated that she doesn't feel well when it's lowered and that her skin and hair are dry. She stated that she has low energy and will talk to her doctor about increasing the dose.      Therapeutic Interventions/Treatment Strategies:  Psychotherapist reinforced use of skills. Treatment modalities used include Cognitive Behavioral Therapy and Dialectical Behavioral Therapy. Interventions include Coping Skills: Reviewed patients current calming practices and discussed a more formal way of practicing and accessing skills, Relapse Prevention: Assisted patient in identifying personal vulnerabilities, thoughts, emotions, and situations that may lead to relapse , and Mindfulness: Facilitated discussion of when/how to use mindfulness skills to benefit general health, mental health symptoms, and stressors and Encouraged a plan to use mindfulness skills in daily life.    Assessment:    Patient response:   Patient responded to session by listening, focusing on goals, and being attentive    Possible barriers to participation / learning include: severity of symptoms    Health Issues:   None reported       Substance Use Review:   Substance Use: Last use: marijuana to get to sleep, but has cut down    Mental Status/Behavioral Observations  Appearance:   Appropriate   Eye Contact:   Good   Psychomotor Behavior: Normal   Attitude:   Cooperative   Orientation:   All  Speech   Rate / Production: Normal    Volume:  Normal   Mood:    Anxious   Affect:    Constricted   Thought Content:   Rumination and Psychosis denies any symptoms of psychosis  Thought Form:  Coherent  Logical     Insight:    Good     Plan:   Safety Plan: Recommended that patient call 911 or go to the local ED should there be a change in any of these risk factors.   Barriers to treatment: None identified  Patient Contracts (see media tab):  None  Substance Use:  Not addressed in session   Continue or Discharge: Patient is being discharged today. See Treatment Plan and Discharge Summary.       Alka De La Paz Psy.D, LP  January 2, 2024

## 2024-01-04 ENCOUNTER — HOSPITAL ENCOUNTER (OUTPATIENT)
Dept: BEHAVIORAL HEALTH | Facility: CLINIC | Age: 59
Discharge: HOME | End: 2024-01-04
Attending: PSYCHIATRY & NEUROLOGY
Payer: COMMERCIAL

## 2024-01-04 PROCEDURE — 90853 GROUP PSYCHOTHERAPY: CPT | Performed by: SOCIAL WORKER

## 2024-01-04 NOTE — GROUP NOTE
Psychotherapy Group Note    PATIENT'S NAME: Jackie Kaplan  MRN:   [id number]  :   1965  ACCT. NUMBER: [id number]  DATE OF SERVICE: 24  START TIME: 12:00 PM  END TIME: 12:50 PM  FACILITATOR: Carisa Yan LICSW  TOPIC: MH EBP Group: Behavioral Activation  [facility] Outpatient Programs  TRACK: IOP/DT 2 P    NUMBER OF PARTICIPANTS: 6    Summary of Group / Topics Discussed:  Behavioral Activation: The Change Process - Behavior Change: Patients explored the process and types of change, including but not limited to, theories of change, steps to making change, methods of changing behavior, and potential barriers.  Patients worked to identify what changes may benefit their daily lives, and work towards a plan to implement change.      Patient Session Goals / Objectives:  Demonstrate understanding of the change process.    Identify positive and negative behavioral patterns.  Make plans to track and implement changes and share experiences in group.  Identify personal barriers to change      Patient Participation / Response:  Fully participated with the group by sharing personal reflections / insights and openly received / provided feedback with other participants.    Demonstrated understanding of topics discussed through group discussion and participation and Practiced skills in session    Treatment Plan:  Patient has a current master individualized treatment plan.  See Epic treatment plan for more information.    CIERRA Galarza

## 2024-01-04 NOTE — GROUP NOTE
Process Group Note    PATIENT'S NAME: Jackie Kaplan  MRN:   [id number]  :   1965  ACCT. NUMBER: [id number]  DATE OF SERVICE: 24  START TIME:  1:00 PM  END TIME:  1:50 PM  FACILITATOR: Carisa Yan LICSW  TOPIC:  Process Group    Diagnoses:  300.02 (F41.1) Generalized Anxiety Disorder.  296.32 (F33.1) Major Depressive Disorder, Recurrent Episode, Moderate _ and With anxious distress  309.9 (F43.9) Unspecified Trauma and Stressor Related Disorder.  5. 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       Per [facility]   PSYCHIATRY DISCHARGE SUMMARY  Admission Date and Time: 10/6/2023 3:11 AM   Discharge Date: 10/8/2023  Discharge Diagnoses   # 1: Bipolar I, manic with psychotic features   # 2: Cannabis use disorder, moderate      Per her report  PTSD                Psychiatry: [facility]        [address]  [place] [address]  Phone: [phone]          Individual Therapist/Name:  [facility] ([facility])  PCP: Mary Carbajal [facility]  TRACK: IOP/DT 2 P    NUMBER OF PARTICIPANTS: 6        Data:    Session content: At the start of this group, patients were invited to check in by identifying themselves, describing their current emotional status, and identifying issues to address in this group.   Area(s) of treatment focus addressed in this session included Symptom Management, Personal Safety, Community Resources/Discharge Planning, Abstinence/Relapse Prevention, Develop / Improve Independent Living Skills, and Develop Socialization / Interpersonal Relationship Skills.    Jackie reports mood is good. Denies S/I or safety issues. Denies psychosis. She reports medication compliance. She endorsed using marijuana. We discussed the risks associated with smoking marijuana and  psychosis. Jackie is engaged in active communication with her partner. She is using coping skills for symptom management and has resilience. She is looking forward to a trip to [place] next month.  Jackie saw a woodpecker in nature which she found enjoyable.      Therapeutic Interventions/Treatment Strategies:  Psychotherapist offered support, feedback and validation and reinforced use of skills. Treatment modalities used include Cognitive Behavioral Therapy.    Assessment:    Patient response:   Patient responded to session by accepting feedback, giving feedback, listening, focusing on goals, being attentive, and verbalizing understanding    Possible barriers to participation / learning include: and no barriers identified    Health Issues:   Yes: consulting with her outpatient providers about her Thyroid medications. , Associated Psychological Distress  She lowered her Thyroid medicaitons and will have lab work.        Substance Use Review:   Substance Use: cannabis .     Mental Status/Behavioral Observations  Appearance:   Appropriate   Eye Contact:   Good   Psychomotor Behavior: Normal   Attitude:   Cooperative  Interested Pleasant  Orientation:   All  Speech   Rate / Production: Normal    Volume:  Normal   Mood:    Normal  Affect:    Appropriate   Thought Content:   Clear and Safety denies any current safety concerns including suicidal ideation, self-harm, and homicidal ideation  Thought Form:  Coherent  Goal Directed  Logical     Insight:    Good     Plan:   Safety Plan: No current safety concerns identified.  Recommended that patient call 911 or go to the local ED should there be a change in any of these risk factors.   Barriers to treatment: None identified  Patient Contracts (see media tab):  None  Substance Use: Provided encouragement towards sobriety   Continue or Discharge: Patient will continue in Adult Day Treatment (ADT)  as planned. Patient is likely to benefit from learning and using skills as they  work toward the goals identified in their treatment plan.      Carisa Yan, [facility]  January 4, 2024

## 2024-01-04 NOTE — GROUP NOTE
Psychoeducation Group Note    PATIENT'S NAME: Jackie Kaplan  MRN:   [id number]  :   1965  ACCT. NUMBER: [id number]  DATE OF SERVICE: 24  START TIME:  2:00 PM  END TIME:  2:50 PM  FACILITATOR: Joyce Haines LICSW; Genna Patrick OTR  TOPIC: MH Life Skills Group: Lifestyle Balance and Structure  [facility] Outpatient [facility]  TRACK: iop/dt 2    NUMBER OF PARTICIPANTS: 6    Summary of Group / Topics Discussed:  Lifestyle Balance and Strucure:  Leisure Experiential: Provided psychoeducation and discussion on benefits of leisure on stress management, parasympathetic NS activation, and wellness. Provided skilled facilitation and clinical observation of patients in context during a leisure experiential designed to provide patients the opportunity to have a social experience as an opportunity to gain self-awareness, build cohesion and social skills, self-monitor personal performance skills, and identify the benefits of activity on their nervous system. Facilitated reflection on the meaning of leisure for participants and barriers to participating in leisure to support recovery.  Patients reflected on how they can participate in leisure throughout their weekend. Validation, support, and guidance provided throughout group.   Patient Session Goals / Objectives:   Learn through an experiential intervention activity the benefits of leisure.   [place] the mechanisms and benefits of leisure activity to create lifestyle balance and improve mental health.   Identified leisure they can participate in during their weekend.       Patient Participation / Response:  Fully participated with the group by sharing personal reflections / insights and openly received / provided feedback with other participants.    Verbalized understanding of content    Treatment Plan:  Patient has a current master individualized treatment plan.  See Epic treatment plan for more information.    Joyce Haines,  LICSW

## 2024-01-08 ENCOUNTER — HOSPITAL ENCOUNTER (OUTPATIENT)
Dept: BEHAVIORAL HEALTH | Facility: CLINIC | Age: 59
Discharge: HOME | End: 2024-01-08
Attending: PSYCHIATRY & NEUROLOGY
Payer: COMMERCIAL

## 2024-01-08 PROCEDURE — 90853 GROUP PSYCHOTHERAPY: CPT | Performed by: PSYCHOLOGIST

## 2024-01-08 PROCEDURE — 90853 GROUP PSYCHOTHERAPY: CPT | Performed by: SOCIAL WORKER

## 2024-01-08 NOTE — GROUP NOTE
"Process Group Note    PATIENT'S NAME: Jackie Kaplan  MRN:   [id number]  :   1965  ACCT. NUMBER: [id number]  DATE OF SERVICE: 24  START TIME:  1:00 PM  END TIME:  1:50 PM  FACILITATOR: Alka De La Paz Psy.D, LP  TOPIC:  Process Group    Diagnoses:  300.02 (F41.1) Generalized Anxiety Disorder.  296.32 (F33.1) Major Depressive Disorder, Recurrent Episode, Moderate _ and With anxious distress  309.9 (F43.9) Unspecified Trauma and Stressor Related Disorder.  5. 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       Per [facility]   PSYCHIATRY DISCHARGE SUMMARY  Admission Date and Time: 10/6/2023 3:11 AM   Discharge Date: 10/8/2023  Discharge Diagnoses   # 1: Bipolar I, manic with psychotic features   # 2: Cannabis use disorder, moderate     Per her report  PTSD                Psychiatry: [facility]        [address]  [place] [address]  Phone: [phone]          Individual Therapist/Name:  [facility] ([facility] [place])  PCP: Mary Carbajal [facility]  TRACK: iop/dt2-  6B    NUMBER OF PARTICIPANTS: 7        Data:    Session content: At the start of this group, patients were invited to check in by identifying themselves, describing their current emotional status, and identifying issues to address in this group.   Area(s) of treatment focus addressed in this session included Symptom Management, Personal Safety, and Community Resources/Discharge Planning.  Client reported being safe today.  Reported mood is \"good.\"   Goal for today is to attend group therapy. The client talked to the group about how she may have found her \"forever home,\" and how she had lost homes in the past, so having a permanent home was important to her. She reported that she will get a blood draw for her thyroid medication " "and talk with her doctor about it. She talked with others about medications and treatment. She showed others a message she got from her partner and read it to others. She talked about thoughts and the brain and how thoughts can be false.      Therapeutic Interventions/Treatment Strategies:  Psychotherapist reinforced use of skills. Treatment modalities used include Cognitive Behavioral Therapy and Dialectical Behavioral Therapy. Interventions include Coping Skills: Discussed how the use of intentional \"in the moment\" actions can help reduce distress, Relapse Prevention: Coached on skills to manage factors that contribute to relapse, Mindfulness: Facilitated discussion of when/how to use mindfulness skills to benefit general health, mental health symptoms, and stressors, and Symptoms Management: Promoted understanding of their diagnoses and how it impacts their functioning.    Assessment:    Patient response:   Patient responded to session by listening, focusing on goals, and being attentive    Possible barriers to participation / learning include: severity of symptoms    Health Issues:   None reported       Substance Use Review:   Substance Use: Last use: she has cut down on marijuana that she smokes to get to sleep    Mental Status/Behavioral Observations  Appearance:   Appropriate   Eye Contact:   Good   Psychomotor Behavior: Normal   Attitude:   Cooperative   Orientation:   All  Speech   Rate / Production: Normal    Volume:  Normal   Mood:    Anxious   Affect:    Constricted   Thought Content:   Rumination and Psychosis denies any symptoms of psychosis  Thought Form:  Coherent  Logical     Insight:    Good     Plan:   Safety Plan: Recommended that patient call 911 or go to the local ED should there be a change in any of these risk factors.   Barriers to treatment: None identified  Patient Contracts (see media tab):  None  Substance Use: Provided encouragement towards sobriety   Continue or Discharge: Patient will " continue in Adult Day Treatment (ADT)  as planned. Patient is likely to benefit from learning and using skills as they work toward the goals identified in their treatment plan.      Jimmy Morocho.MIKIE, LP  January 8, 2024

## 2024-01-08 NOTE — GROUP NOTE
Psychotherapy Group Note    PATIENT'S NAME: Jackie Kaplan  MRN:   [id number]  :   1965  ACCT. NUMBER: [id number]  DATE OF SERVICE: 24  START TIME: 12:00 PM  END TIME: 12:50 PM  FACILITATOR: Alka De La Paz Psy.D, LP  TOPIC:  EBP Group: Coping Skills  [facility]  TRACK: iop/dt2-  6B    NUMBER OF PARTICIPANTS: 6    Summary of Group / Topics Discussed:  Coping Skills: Coping with Psychosis: Patients discussed the nature of psychotic symptoms and how they each currently manage them. Discussed and learned specific coping skills relevant to managing symptoms of psychosis and thought disorders. Patients discussed situations in which using these strategies will help improve emotion regulation and functioning. They began to distinguish when these strategies could be useful in their lives for management of stress and psychological well-being.    Patient Session Goals / Objectives:  Identify and understand what factors may contribute to increased symptoms of psychosis.  Identify actions that may be taken to manage increased symptoms/stressors.  Identify specific skills to help them manage their symptoms of psychosis  Problem solve barriers to the use of skills.  Increase confidence / proficiency with skill application.      Patient Participation / Response:  Fully participated with the group by sharing personal reflections / insights and openly received / provided feedback with other participants.    Expressed understanding of the relevance / importance of coping skills at distressing times in life    Treatment Plan:  Patient has a current master individualized treatment plan.  See Epic treatment plan for more information.    Alka De La Paz Psy.D, ANGUS

## 2024-01-08 NOTE — GROUP NOTE
Psychoeducation Group Note    PATIENT'S NAME: Jackie Kaplan  MRN:   [id number]  :   1965  [id number]  DATE OF SERVICE: 24  START TIME:  2:00 PM  END TIME:  2:50 PM  FACILITATOR: Carisa Yan LICSW; Sandy Campbell RN  TOPIC:  Wellness Group: Medication Education and Management  [facility]  TRACK: IOP/DT 2    NUMBER OF PARTICIPANTS: 7    Summary of Group / Topics Discussed:  Medication Educations and Management:  Medication Jeopardy: Patients provided education regarding medication safety, antidepressants, side effects, neuroleptics, expected medication outcomes, knowledge of diagnosis, symptoms, and symptom management through an engaging jeopardy-style format.     Patient Session Goals / Objectives:    Participated in team-based Jeopardy game  Identified strategies for safe use, handling, and disposal of medications  Discussed basic aspects of medication safety, side effects, adverse outcomes and contraindications      Patient Participation / Response:  Fully participated with the group by sharing personal reflections / insights and openly received / provided feedback with other participants.     Demonstrated understanding of topics discussed through group discussion and participation and Verbalized understanding of medication education and management topic    Treatment Plan:  Patient has a current master individualized treatment plan.  See Epic treatment plan for more information.    CIERRA Galarza

## 2024-01-09 ENCOUNTER — HOSPITAL ENCOUNTER (OUTPATIENT)
Dept: BEHAVIORAL HEALTH | Facility: CLINIC | Age: 59
Discharge: HOME | End: 2024-01-09
Attending: PSYCHIATRY & NEUROLOGY
Payer: COMMERCIAL

## 2024-01-09 PROCEDURE — 90853 GROUP PSYCHOTHERAPY: CPT | Performed by: SOCIAL WORKER

## 2024-01-09 PROCEDURE — 90853 GROUP PSYCHOTHERAPY: CPT | Performed by: PSYCHOLOGIST

## 2024-01-09 NOTE — GROUP NOTE
Psychotherapy Group Note    PATIENT'S NAME: Jackie Kaplan  MRN:   [id number]  :   1965  ACCT. NUMBER: [id number]  DATE OF SERVICE: 24  START TIME: 12:00 PM  END TIME: 12:50 PM  FACILITATOR: Alka De La Paz Psy.D, LP  TOPIC:  EBP Group: Emotions Management  [facility]  TRACK: iop/dt2p  6B    NUMBER OF PARTICIPANTS: 6    Summary of Group / Topics Discussed:  Emotions Management: Model of Emotions: Patients were introduced to the cyclical model of emotions.  Explored emotions are shaped by different events and one s interpretation of events.  Group discussed how emotions begin with an event, followed by one s interpretation, followed by associated feelings.  Discussion included a review of personal urges and actions that can/do follow an emotional experience in the patient s life, and the end results.    Patient Session Goals / Objectives:  Demonstrate understanding of types various emotions.  Identify and discuss specific emotions and when they occur; understand triggers.  Identify individual emotions and physical sensations that accompany them.  Discuss urges and actions, and how to influence the intensity of emotional reactions and disrupt the cycle.    Discuss barriers to emotional regulation.  Choose 1-2 strategies to assist with emotional response to potentially distressing situations.      Patient Participation / Response:  Fully participated with the group by sharing personal reflections / insights and openly received / provided feedback with other participants.    Expressed understanding of the relevance / importance of emotions management skills at distressing times in life    Treatment Plan:  Patient has a current master individualized treatment plan.  See Epic treatment plan for more information.    Alka De La Paz Psy.D, ANGUS

## 2024-01-09 NOTE — GROUP NOTE
"Process Group Note    PATIENT'S NAME: Jackie Kaplan  MRN:   [id number]  :   1965  ACCT. NUMBER: [id number]  DATE OF SERVICE: 24  START TIME:  1:00 PM  END TIME:  1:50 PM  FACILITATOR: Alka De La Paz Psy.D, LP  TOPIC:  Process Group    Diagnoses:  300.02 (F41.1) Generalized Anxiety Disorder.  296.32 (F33.1) Major Depressive Disorder, Recurrent Episode, Moderate _ and With anxious distress  309.9 (F43.9) Unspecified Trauma and Stressor Related Disorder.  5. 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       Per [facility]   PSYCHIATRY DISCHARGE SUMMARY  Admission Date and Time: 10/6/2023 3:11 AM   Discharge Date: 10/8/2023  Discharge Diagnoses   # 1: Bipolar I, manic with psychotic features   # 2: Cannabis use disorder, moderate     Per her report  PTSD                Psychiatry: [facility]        [address]  [place] [address]  Phone: [phone]          Individual Therapist/Name:  [facility] ([facility])  PCP: Mary Carbajal [facility]  TRACK: iop/dt2p   6B    NUMBER OF PARTICIPANTS: 5        Data:    Session content: At the start of this group, patients were invited to check in by identifying themselves, describing their current emotional status, and identifying issues to address in this group.   Area(s) of treatment focus addressed in this session included Symptom Management, Personal Safety, and Community Resources/Discharge Planning.  Client reported being safe today.  Reported mood is \"kind of spicy.\"   Goal for today is to attend group therapy. The client talked to the group about how she felt child custody issues and insurance were unfair and talked to other members about it. She reported that she is \"trying to find her forever home and may have found a condo that she " likes. She talked with others about the justice system and school counselors. She talked about her upcoming doctor appointment for her thyroid medication and that she didn't like the dose that it was at currently and that they would most likely raise it.      Therapeutic Interventions/Treatment Strategies:  Psychotherapist reinforced use of skills. Treatment modalities used include Cognitive Behavioral Therapy and Dialectical Behavioral Therapy. Interventions include Coping Skills: Reviewed patients current calming practices and discussed a more formal way of practicing and accessing skills, Relapse Prevention: Assisted patient in identifying personal vulnerabilities, thoughts, emotions, and situations that may lead to relapse , Mindfulness: Facilitated discussion of when/how to use mindfulness skills to benefit general health, mental health symptoms, and stressors, and Emotions Management:  Discussed barriers to emotional regulation.    Assessment:    Patient response:   Patient responded to session by accepting feedback, giving feedback, and being attentive    Possible barriers to participation / learning include: severity of symptoms    Health Issues:   None reported       Substance Use Review:   Substance Use: Last use: smokes a little marijuana to get to sleep    Mental Status/Behavioral Observations  Appearance:   Appropriate   Eye Contact:   Good   Psychomotor Behavior: Normal   Attitude:   Cooperative   Orientation:   All  Speech   Rate / Production: Normal    Volume:  Normal   Mood:    Anxious   Affect:    Constricted   Thought Content:   Rumination and Psychosis denies any symptoms of psychosis  Thought Form:  Coherent  Logical     Insight:    Good     Plan:   Safety Plan: Recommended that patient call 911 or go to the local ED should there be a change in any of these risk factors.   Barriers to treatment: None identified  Patient Contracts (see media tab):  None  Substance Use: Not addressed in session    Continue or Discharge: Patient will continue in Adult Day Treatment (ADT)  as planned. Patient is likely to benefit from learning and using skills as they work toward the goals identified in their treatment plan.      Alka De La Paz Psy.D, LP  January 9, 2024

## 2024-01-09 NOTE — GROUP NOTE
Psychoeducation Group Note    PATIENT'S NAME: Jackie Kaplan  MRN:   [id number]  :   1965  ACCT. NUMBER: [id number]  DATE OF SERVICE: 24  START TIME:  2:00 PM  END TIME:  2:50 PM  FACILITATOR: Carisa Yan LICSW; Genna Patrick OTR  TOPIC: MH Life Skills Group: Resiliency Development  [facility]  TRACK: IOP/DT 2    NUMBER OF PARTICIPANTS: 5    Summary of Group / Topics Discussed:  Resiliency Development: Wellness: Provided education on wellness as an active process to reach a state of health and fulfillment. Facilitated discussion on the holistic understanding of wellness (physical, emotional, social, spiritual, mental, and environmental) and talked collaboratively about their personal ideas of wellness. Facilitated a self-reflective process where patients identified wellness values and goals through a creative expression task. Patients shared with the group their wellness vision board and reasoning behind their wellness visions. Validation and support provided throughout group process.     Patient Session Goals / Objectives:   *Discuss the topic of wellness in a collaborative, supportive manner.   *Identify personal wellness motives and goals.   *Engage in an experiential intervention to explore wellness in an individualized manner.   *Reflect on the process and share insight around their engagement in the creative expression task.       Patient Participation / Response:  Fully participated with the group by sharing personal reflections / insights and openly received / provided feedback with other participants.    Verbalized understanding of content    Treatment Plan:  Patient has a current master individualized treatment plan.  See Epic treatment plan for more information.    CIERRA Galarza

## 2024-01-11 ENCOUNTER — HOSPITAL ENCOUNTER (OUTPATIENT)
Dept: BEHAVIORAL HEALTH | Facility: CLINIC | Age: 59
Discharge: HOME | End: 2024-01-11
Attending: PSYCHIATRY & NEUROLOGY
Payer: COMMERCIAL

## 2024-01-11 PROCEDURE — 90853 GROUP PSYCHOTHERAPY: CPT | Performed by: PSYCHOLOGIST

## 2024-01-11 PROCEDURE — 90853 GROUP PSYCHOTHERAPY: CPT | Performed by: SOCIAL WORKER

## 2024-01-11 NOTE — GROUP NOTE
Psychoeducation Group Note    PATIENT'S NAME: Jackie Kaplan  MRN:   [id number]  :   1965  ACCT. NUMBER: [id number]  DATE OF SERVICE: 24  START TIME:  2:00 PM  END TIME:  2:50 PM  FACILITATOR: Carisa Yan LICSW; Genna Patrick OTR  TOPIC: MH Life Skills Group: Resiliency Development  [facility]  TRACK: IOP/DT 2    NUMBER OF PARTICIPANTS: 5    Summary of Group / Topics Discussed:  Resiliency Development:  Wellness Vision Board pt. 2: Provided education on wellness as an active process to reach a state of health and fulfillment. Facilitated discussion on the holistic understanding of wellness (physical, emotional, social, spiritual, mental, and environmental) and talked collaboratively about their personal ideas of wellness. Facilitated a self-reflective process where patients identified wellness values and goals through a creative expression task. Patients shared with the group their wellness vision board and reasoning behind their wellness visions. Validation and support provided throughout group process.     Patient Session Goals / Objectives:   * Discuss the topic of wellness in a collaborative, supportive manner.   * Identify personal wellness motives and goals.   * Engage in an experiential intervention to explore wellness in an individualized manner.   * Reflect on the process and share insight around their engagement in the creative expression task.       Patient Participation / Response:  Fully participated with the group by sharing personal reflections / insights and openly received / provided feedback with other participants.    Verbalized understanding of content    Treatment Plan:  Patient has a current master individualized treatment plan.  See Epic treatment plan for more information.    CIERRA Galarza

## 2024-01-11 NOTE — GROUP NOTE
"Process Group Note    PATIENT'S NAME: Jackie Kaplan  MRN:   [id number]  :   1965  ACCT. NUMBER: [id number]  DATE OF SERVICE: 24  START TIME:  1:00 PM  END TIME:  1:50 PM  FACILITATOR: Alka De La Paz Psy.D, LP  TOPIC:  Process Group    Diagnoses:  300.02 (F41.1) Generalized Anxiety Disorder.  296.32 (F33.1) Major Depressive Disorder, Recurrent Episode, Moderate _ and With anxious distress  309.9 (F43.9) Unspecified Trauma and Stressor Related Disorder.  5. 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       Per [facility]   PSYCHIATRY DISCHARGE SUMMARY  Admission Date and Time: 10/6/2023 3:11 AM   Discharge Date: 10/8/2023  Discharge Diagnoses   # 1: Bipolar I, manic with psychotic features   # 2: Cannabis use disorder, moderate     Per her report  PTSD                Psychiatry: [facility]        [address]  [place] [address]  Phone: [phone]          Individual Therapist/Name:  [facility] ([facility])  PCP: Mary Carbaj[facility]  TRACK: iop/dt2p  6B    NUMBER OF PARTICIPANTS: 6        Data:    Session content: At the start of this group, patients were invited to check in by identifying themselves, describing their current emotional status, and identifying issues to address in this group.   Area(s) of treatment focus addressed in this session included Symptom Management, Personal Safety, and Community Resources/Discharge Planning.  Client reported being safe today.  Reported mood is \"tired.\"    Goal for today is to attend group therapy. The client talked to the group about how she felt that group was helpful and brought up frustrations from the past and helped her work through the problems. She talked to others about staying focused on their own problems and working on " "themselves and not getting caught up in other people's opinions. She talked with others about paranoia and intense emotions and how they can influence thoughts and how she managed with troubles in the past.   She reported that her partner put on his hearing aids today and she was happy that he did so.      Therapeutic Interventions/Treatment Strategies:  Psychotherapist reinforced use of skills. Treatment modalities used include Cognitive Behavioral Therapy and Dialectical Behavioral Therapy. Interventions include Coping Skills: Assisted patient in identifying 1-2 healthy distraction skills to reduce overall distress and Discussed how the use of intentional \"in the moment\" actions can help reduce distress, Relapse Prevention: Assisted patient in identifying personal vulnerabilities, thoughts, emotions, and situations that may lead to relapse , Mindfulness: Facilitated discussion of when/how to use mindfulness skills to benefit general health, mental health symptoms, and stressors, and Symptoms Management: Promoted understanding of their diagnoses and how it impacts their functioning.    Assessment:    Patient response:   Patient responded to session by giving feedback, listening, and focusing on goals    Possible barriers to participation / learning include: severity of symptoms    Health Issues:   None reported       Substance Use Review:   Substance Use: Last use: some pot to get to sleep    Mental Status/Behavioral Observations  Appearance:   Appropriate   Eye Contact:   Good   Psychomotor Behavior: Normal   Attitude:   Cooperative   Orientation:   All  Speech   Rate / Production: Normal    Volume:  Normal   Mood:    Anxious  Depressed   Affect:    Constricted   Thought Content:   Rumination and Psychosis denies any symptoms of psychosis  Thought Form:  Coherent  Logical     Insight:    Good     Plan:   Safety Plan: Recommended that patient call 911 or go to the local ED should there be a change in any of these " risk factors.   Barriers to treatment: None identified  Patient Contracts (see media tab):  None  Substance Use: Provided encouragement towards sobriety   Continue or Discharge: Patient will continue in Adult Day Treatment (ADT)  as planned. Patient is likely to benefit from learning and using skills as they work toward the goals identified in their treatment plan.      Jimmy Morocho.MIKIE, LP  January 11, 2024

## 2024-01-11 NOTE — GROUP NOTE
Psychotherapy Group Note    PATIENT'S NAME: Jackie Kaplan  MRN:   [id number]  :   1965  ACCT. NUMBER: [id number]  DATE OF SERVICE: 24  START TIME: 12:00 PM  END TIME: 12:50 PM  FACILITATOR: Alka De La Paz Psy.D, LP  TOPIC: MH EBP Group: Coping Skills  [facility] Programs  TRACK: iop/dt2p   6B    NUMBER OF PARTICIPANTS:     Summary of Group / Topics Discussed:  Coping Skills: Relapse Planning: Patients discussed and increased understanding of how anticipating and planning for possible increased symptoms is a proactive way to reduce the likelihood of relapse.  Patients shared individualized factors that may lead to increased symptoms and decompensation in functioning.  Each patient developed a relapse prevention plan designed to help them recognize when they may have increasing symptoms requiring them to take action and notify their key supports and care team.      Patient Session Goals / Objectives:  Identify and understand what factors may contribute to symptom relapse.  Identify actions that may be taken to manage increased symptoms/stressors.  Create an individualized written relapse plan  Problem solve barriers to plan creation and implementation  Share relapse plan with key support people      Patient Participation / Response:  Fully participated with the group by sharing personal reflections / insights and openly received / provided feedback with other participants.    Expressed understanding of the relevance / importance of coping skills at distressing times in life    Treatment Plan:  Patient has a current master individualized treatment plan.  See Epic treatment plan for more information.    Alka De La Paz Psy.D, ANGUS

## 2024-01-15 ENCOUNTER — HOSPITAL ENCOUNTER (OUTPATIENT)
Dept: BEHAVIORAL HEALTH | Facility: CLINIC | Age: 59
Discharge: HOME | End: 2024-01-15
Attending: PSYCHIATRY & NEUROLOGY
Payer: COMMERCIAL

## 2024-01-15 PROCEDURE — 90840 PSYTX CRISIS EA ADDL 30 MIN: CPT | Performed by: PSYCHOLOGIST

## 2024-01-15 PROCEDURE — 90853 GROUP PSYCHOTHERAPY: CPT | Performed by: PSYCHOLOGIST

## 2024-01-15 PROCEDURE — 90832 PSYTX W PT 30 MINUTES: CPT | Mod: 59 | Performed by: PSYCHOLOGIST

## 2024-01-15 PROCEDURE — 90853 GROUP PSYCHOTHERAPY: CPT

## 2024-01-15 NOTE — GROUP NOTE
"Process Group Note    PATIENT'S NAME: Jackie Kaplan  MRN:   [id number]  :   1965  ACCT. NUMBER: [id number]  DATE OF SERVICE: 1/15/24  START TIME:  1:00 PM  END TIME:  1:50 PM  FACILITATOR: Alka De La Paz Psy.D, LP  TOPIC:  Process Group    Diagnoses  300.02 (F41.1) Generalized Anxiety Disorder.  296.32 (F33.1) Major Depressive Disorder, Recurrent Episode, Moderate _ and With anxious distress  309.9 (F43.9) Unspecified Trauma and Stressor Related Disorder.  5. 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       Per [facility]   PSYCHIATRY DISCHARGE SUMMARY  Admission Date and Time: 10/6/2023 3:11 AM   Discharge Date: 10/8/2023  Discharge Diagnoses   # 1: Bipolar I, manic with psychotic features   # 2: Cannabis use disorder, moderate     Per her report  PTSD                Psychiatry: [facility]        [address]  [place] [address]  Phone: [phone]          Individual Therapist/Name:  [facility] ([facility])  PCP: Mary Carbajal [facility]patient [facility]  TRACK: iop/dt2p merged with iop/dt3    NUMBER OF PARTICIPANTS: 2        Data:    Session content: At the start of this group, patients were invited to check in by identifying themselves, describing their current emotional status, and identifying issues to address in this group.   Area(s) of treatment focus addressed in this session included Symptom Management, Personal Safety, and Community Resources/Discharge Planning.  Client reported being safe today.  Reported mood is \"alright.\"    Goal for today is to attend group therapy. The client talked to the group about how she appreciated being part of a larger group and that her son was in wrestling and she felt happy about being part of the wrestling crowd at the time. She talked with others about " her home and how she made it comfortable. She reported no psychosis, and some anxiety, and smokes some pot to get to sleep at night. She reported that she and her  will look at a home in a neighborhood from her childhood and she is excited to do this.      Therapeutic Interventions/Treatment Strategies:  Psychotherapist reinforced use of skills. Treatment modalities used include Cognitive Behavioral Therapy and Dialectical Behavioral Therapy. Interventions include Coping Skills: Reviewed patients current calming practices and discussed a more formal way of practicing and accessing skills, Relapse Prevention: Assisted patient in identifying personal vulnerabilities, thoughts, emotions, and situations that may lead to relapse , Mindfulness: Facilitated discussion of when/how to use mindfulness skills to benefit general health, mental health symptoms, and stressors, and Symptoms Management: Promoted understanding of their diagnoses and how it impacts their functioning.    Assessment:    Patient response:   Patient responded to session by giving feedback, listening, and accepting support    Possible barriers to participation / learning include: severity of symptoms    Health Issues:   None reported       Substance Use Review:   Substance Use: Last use: smoked some pot to get to sleep    Mental Status/Behavioral Observations  Appearance:   Appropriate   Eye Contact:   Good   Psychomotor Behavior: Normal   Attitude:   Cooperative   Orientation:   All  Speech   Rate / Production: Normal    Volume:  Normal   Mood:    Anxious   Affect:    Constricted   Thought Content:   Rumination and Psychosis denies any symptoms of psychosis  Thought Form:  Coherent  Logical     Insight:    Good     Plan:   Safety Plan: Recommended that patient call 911 or go to the local ED should there be a change in any of these risk factors.   Barriers to treatment: None identified  Patient Contracts (see media tab):  None  Substance Use:  Provided encouragement towards sobriety   Continue or Discharge: Patient will continue in Adult Day Treatment (ADT)  as planned. Patient is likely to benefit from learning and using skills as they work toward the goals identified in their treatment plan.      Alka De La Paz Psy.D, LP  January 15, 2024

## 2024-01-15 NOTE — GROUP NOTE
Psychotherapy Group Note    PATIENT'S NAME: Jackie Kaplan  MRN:   [id number]  :   1965  ACCT. NUMBER: [id number]  DATE OF SERVICE: 1/15/24  START TIME:  2:00 PM  END TIME:  2:50 PM  FACILITATOR: Luciana Whiteside LICSW; Malu Seo RN  TOPIC:  EBP Group: Self-Awareness  [facility] Outpatient [facility]  TRACK: IOP DT 2 (Merged with IOP DT 3)    NUMBER OF PARTICIPANTS: 6    Summary of Group / Topics Discussed:  Self-Awareness: Self-Compassion: Patients received overview of key concepts in developing self-compassion. Patients discussed mindfulness, self-kindness, and finding common humanity. Patients identified their current approach to problems in their lives and learned skills for increasing self-compassion. Patients identified ways they can put self-compassion skills into practice and problem solve barriers to application of skills.     Patient Session Goals / Objectives:  [place] components of self-compassion  Identify ways to practice self-compassion in daily life  Problem solve barriers to self-compassion practice      Patient Participation / Response:  Fully participated with the group by sharing personal reflections / insights and openly received / provided feedback with other participants.    Demonstrated understanding of topics discussed through group discussion and participation and Practiced skills in session    Treatment Plan:  Patient has a current master individualized treatment plan.  See Epic treatment plan for more information.    CIERRA Stevens

## 2024-01-15 NOTE — PROGRESS NOTES
Progress Note    1/15/24    6B iop/dt2p    Met with client for individual therapy and discussed relaxation and mindfulness. Client reported that They use relaxation and mindfulness during the day.   Client denied any suicidal ideation.  Client reported some anxiety and stated that the relaxation and mindfulness  Was helpful.    Jimmy Mckeon., D,  Licensed Clinical Psychologist

## 2024-01-16 ENCOUNTER — TELEPHONE (OUTPATIENT)
Dept: BEHAVIORAL HEALTH | Facility: CLINIC | Age: 59
End: 2024-01-16
Payer: COMMERCIAL

## 2024-01-16 ENCOUNTER — HOSPITAL ENCOUNTER (OUTPATIENT)
Dept: BEHAVIORAL HEALTH | Facility: CLINIC | Age: 59
Discharge: HOME | End: 2024-01-16
Attending: PSYCHIATRY & NEUROLOGY
Payer: COMMERCIAL

## 2024-01-16 PROCEDURE — 90853 GROUP PSYCHOTHERAPY: CPT | Performed by: PSYCHOLOGIST

## 2024-01-16 PROCEDURE — 90853 GROUP PSYCHOTHERAPY: CPT | Performed by: SOCIAL WORKER

## 2024-01-16 NOTE — GROUP NOTE
Psychoeducation Group Note    PATIENT'S NAME: Jackie Kaplan  MRN:   [id number]  :   1965  ACCT. NUMBER: [id number]  DATE OF SERVICE: 24  START TIME:  2:00 PM  END TIME:  2:50 PM  FACILITATOR: Carisa Yan LICSW; Genna Patrick OTR  TOPIC: MH Life Skills Group: Sensory Approaches in Mental Health  [facility] Outpatient Programs  TRACK: IOP/DT 2    NUMBER OF PARTICIPANTS: 3    Summary of Group / Topics Discussed:  Sensory Approaches in Mental Health:  Coping Through the Senses: Patients were introduced and taught about neurosensory based skills and strategies related to supporting effective self-regulation skills.  Patients were taught about the eight sensory systems and how they can be used for coping with mental health symptoms and stressors.  Patients were provided with an experiential opportunity to increase self-awareness of helpful sensory input and self-care activities. Patients were introduced how to create supportive environments that encourage use of these skills.            Patient Session Goals / Objectives:   *Identified specific and individualized neurosensory skills to help when distressed.     *Identified skills learned and how this applies to current daily life.   *Established a plan for practice of these skills in their own environments.       Patient Participation / Response:  Fully participated with the group by sharing personal reflections / insights and openly received / provided feedback with other participants.    Verbalized understanding of content    Treatment Plan:  Patient has a current master individualized treatment plan.  See Epic treatment plan for more information.    CIERRA Galarza

## 2024-01-16 NOTE — GROUP NOTE
"Process Group Note    PATIENT'S NAME: Jackie Kaplan  MRN:   [id number]  :   1965  ACCT. NUMBER: [id number]  DATE OF SERVICE: 24  START TIME:  1:00 PM  END TIME:  1:50 PM  FACILITATOR: Alka De La Paz Psy.D, LP  TOPIC:  Process Group    Diagnoses:  300.02 (F41.1) Generalized Anxiety Disorder.  296.32 (F33.1) Major Depressive Disorder, Recurrent Episode, Moderate _ and With anxious distress  309.9 (F43.9) Unspecified Trauma and Stressor Related Disorder.  5. 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       Per [facility]   PSYCHIATRY DISCHARGE SUMMARY  Admission Date and Time: 10/6/2023 3:11 AM   Discharge Date: 10/8/2023  Discharge Diagnoses   # 1: Bipolar I, manic with psychotic features   # 2: Cannabis use disorder, moderate     Per her report  PTSD                Psychiatry: [facility]        [address]  [place] [address]  Phone: [phone]          Individual Therapist/Name:  [facility] ([facility])  PCP: Mary Carbajal [facility] Adult Mental [facility] Outpatient [facility]  TRACK: iop/dt2p  6B    NUMBER OF PARTICIPANTS: 3        Data:    Session content: At the start of this group, patients were invited to check in by identifying themselves, describing their current emotional status, and identifying issues to address in this group.   Area(s) of treatment focus addressed in this session included Symptom Management, Personal Safety, and Community Resources/Discharge Planning.  Client reported being safe today.  Reported mood is \"ok and hopeful\"   Goal for today is to come to therapy. The client talked to the group about how she and her  went to look at a house in her old neighborhood and put an offer on it, and they put money down to buy it. She reported feeling happy to have a home of her own and talked to " "others about how she lost other homes due to financial situations. She stated that they won't move in till all the work on the house is done and talked about repairs and painting, and how they had workers to help them remodel. She reported taking medications and not having any psychosis symptoms. She reported that she will see her doctor on Friday and get her blood draw for her thyroid medication and hoped it would be raised, as she can feel the side effects of the lower dose.   She reported that she forgot to take a dose last night of her medications and will take it today.    Therapeutic Interventions/Treatment Strategies:  Psychotherapist offered support, feedback and validation. Treatment modalities used include Cognitive Behavioral Therapy and Dialectical Behavioral Therapy. Interventions include Coping Skills: Reviewed patients current calming practices and discussed a more formal way of practicing and accessing skills, Relapse Prevention: Assisted patient in identifying personal vulnerabilities, thoughts, emotions, and situations that may lead to relapse , Mindfulness: Facilitated discussion of when/how to use mindfulness skills to benefit general health, mental health symptoms, and stressors, and Symptoms Management: Promoted understanding of their diagnoses and how it impacts their functioning.    Assessment:    Patient response:   Patient responded to session by listening, focusing on goals, and being attentive    Possible barriers to participation / learning include: severity of symptoms    Health Issues:   None reported       Substance Use Review:   Substance Use: Last use: smokes a \"lil bit\" of marijuana to get to sleep    Mental Status/Behavioral Observations  Appearance:   Appropriate   Eye Contact:   Good   Psychomotor Behavior: Normal   Attitude:   Cooperative   Orientation:   All  Speech   Rate / Production: Normal    Volume:  Normal   Mood:    Anxious   Affect:    Constricted   Thought Content: "   Rumination and Psychosis denies any symptoms of psychosis  Thought Form:  Coherent  Logical     Insight:    Good     Plan:   Safety Plan: Recommended that patient call 911 or go to the local ED should there be a change in any of these risk factors.   Barriers to treatment: None identified  Patient Contracts (see media tab):  None  Substance Use: Provided encouragement towards sobriety   Continue or Discharge: Patient will continue in Adult Day Treatment (ADT)  as planned. Patient is likely to benefit from learning and using skills as they work toward the goals identified in their treatment plan.      Jimmy Morocho.D, LP  January 16, 2024

## 2024-01-16 NOTE — GROUP NOTE
Psychotherapy Group Note    PATIENT'S NAME: Jackie Kaplan  MRN:   [id number]  :   1965  ACCT. NUMBER: [id number]  DATE OF SERVICE: 24  START TIME: 12:00 PM  END TIME: 12:50 PM  FACILITATOR: Alka De La Paz Psy.D, LP  TOPIC: MH EBP Group: Behavioral Activation  [facility]  TRACK: iop/dt2p    NUMBER OF PARTICIPANTS: 3    Summary of Group / Topics Discussed:  Behavioral Activation: The Change Process - Behavior Change: Patients explored the process and types of change, including but not limited to, theories of change, steps to making change, methods of changing behavior, and potential barriers.  Patients worked to identify what changes may benefit their daily lives, and work towards a plan to implement change.      Patient Session Goals / Objectives:  Demonstrate understanding of the change process.    Identify positive and negative behavioral patterns.  Make plans to track and implement changes and share experiences in group.  Identify personal barriers to change      Patient Participation / Response:  Fully participated with the group by sharing personal reflections / insights and openly received / provided feedback with other participants.    Expressed understanding of the relationship between behaviors, thoughts, and feelings and Shared experiences and challenges with making behavioral changes    Treatment Plan:  Patient has a current master individualized treatment plan.  See Epic treatment plan for more information.    Alka De La Paz Psy.D, ANGUS

## 2024-01-16 NOTE — ADDENDUM NOTE
Encounter addended by: Alka De La Paz Psy.D, LP on: 1/16/2024 2:06 PM   Actions taken: Clinical Note Signed

## 2024-01-16 NOTE — GROUP NOTE
Psychotherapy Group Note    PATIENT'S NAME: Jackie Kaplan  MRN:   [id number]  :   1965  ACCT. NUMBER: [id number]  DATE OF SERVICE: 1/15/24  START TIME: 12:00 PM  END TIME:      FACILITATOR: Alka De La Paz Psy.D, LP  TOPIC:     Individual 1:1 time was used to review symptoms and talk about mindfulness skills.  Deep breathing techniques were practiced.     [facility] Out[facility]  TRACK: [facility]/dt2p  6B    NUMBER OF PARTICIPANTS: 2    Summary of Group / Topics Discussed:  Mindfulness: Mindfulness Skills: Patients received information on the main components of mindfulness. Patients participated in discussion on how to practice observing, describing, and participating in internal and external environments. Relevance of mindfulness skills to overall mental and physical health was explored.  Patients explored and discussed in group their current awareness and knowledge of mindfulness skills as well as barriers to applying skills.    Patient Session Goals / Objectives:  Demonstrated and verbalized understanding of key mindfulness concepts  Identified when/how to use mindfulness skills  Resolved barriers to practicing mindfulness skills  Identified plan to use mindfulness skills in daily life     Patient Participation / Response:  Fully participated with the group by sharing personal reflections / insights and openly received / provided feedback with other participants.    Demonstrated understanding of mindfulness skills and benefits of practice    Treatment Plan:  Patient has a current master individualized treatment plan.  See Epic treatment plan for more information.    Alka De La Paz Psy.D, ANGUS

## 2024-01-16 NOTE — TELEPHONE ENCOUNTER
----- Message from Ian LAINEZ Elpidio sent at 1/16/2024 11:48 AM CST -----  Regarding: Add Additional Appts  Hi [facility] team,     Please add appt for today, Jan. 16, patient arrived for group. Will need future appts until Jan. 25.      Patient Name: Jackie Kpalan  Location of programming: [facility]   Group: Adult MH IOP/DT 2 P  Attending Provider: Teo Vu   Duration of Appointment in minutes: 180 minutes   Visit Type: Treatment [870]     Thank you

## 2024-01-18 ENCOUNTER — HOSPITAL ENCOUNTER (OUTPATIENT)
Dept: BEHAVIORAL HEALTH | Facility: CLINIC | Age: 59
Discharge: HOME | End: 2024-01-18
Attending: PSYCHIATRY & NEUROLOGY
Payer: COMMERCIAL

## 2024-01-18 PROCEDURE — 90853 GROUP PSYCHOTHERAPY: CPT | Performed by: PSYCHOLOGIST

## 2024-01-18 PROCEDURE — 90853 GROUP PSYCHOTHERAPY: CPT | Performed by: SOCIAL WORKER

## 2024-01-18 NOTE — GROUP NOTE
"Process Group Note    PATIENT'S NAME: Jackie Kaplan  MRN:   [id number]  :   1965  ACCT. NUMBER: [id number]  DATE OF SERVICE: 24  START TIME:  1:00 PM  END TIME:  1:50 PM  FACILITATOR: Alka De La Paz Psy.D, LP  TOPIC:  Process Group    Diagnoses:  300.02 (F41.1) Generalized Anxiety Disorder.  296.32 (F33.1) Major Depressive Disorder, Recurrent Episode, Moderate _ and With anxious distress  309.9 (F43.9) Unspecified Trauma and Stressor Related Disorder.  5. 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       Per [facility]   PSYCHIATRY DISCHARGE SUMMARY  Admission Date and Time: 10/6/2023 3:11 AM   Discharge Date: 10/8/2023  Discharge Diagnoses   # 1: Bipolar I, manic with psychotic features   # 2: Cannabis use disorder, moderate     Per her report  PTSD                Psychiatry: [facility]        [address]  [place] [address]  Phone: [phone]          Individual Therapist/Name:  [facility] ([facility])  PCP: Mary Carbaj[facility]  TRACK: iop/dt2p    NUMBER OF PARTICIPANTS: 5        Data:    Session content: At the start of this group, patients were invited to check in by identifying themselves, describing their current emotional status, and identifying issues to address in this group.   Area(s) of treatment focus addressed in this session included Symptom Management, Personal Safety, and Community Resources/Discharge Planning.  Client reported being safe today.  Reported mood is \"ok.\"    Goal for today is to attend group therapy and talk with others. The client talked to the group about how she and her  withdrew their offer to buy a house because of the cost to fix up the house. She reported that she continues to look. She reported no psychosis. She talked with " "others about her mom's death and her step-dad and the loss of her mom's house to the step-dad. She reported feeling resentful at first and then realizing that he will have to face his own actions. She reported that she smokes some pot to improve her appetite.      Therapeutic Interventions/Treatment Strategies:  Psychotherapist reinforced use of skills. Treatment modalities used include Cognitive Behavioral Therapy and Dialectical Behavioral Therapy. Interventions include Cognitive Restructuring:  Assisted patient in identifying new neutral/positive core beliefs, Coping Skills: Discussed how the use of intentional \"in the moment\" actions can help reduce distress, Reviewed patients current calming practices and discussed a more formal way of practicing and accessing skills, and Promoted understanding of how and when to apply grounding strategies to reduce distress and increase presence in the moment, and Relapse Prevention: Facilitated understanding the importance of awareness of factors that contribute to relapse .    Assessment:    Patient response:   Patient responded to session by giving feedback, listening, and being attentive    Possible barriers to participation / learning include: severity of symptoms    Health Issues:   None reported       Substance Use Review:   Substance Use: Last use: smokes pot to get to sleep and for appetite.    Mental Status/Behavioral Observations  Appearance:   Appropriate   Eye Contact:   Good   Psychomotor Behavior: Normal   Attitude:   Cooperative   Orientation:   All  Speech   Rate / Production: Normal    Volume:  Normal   Mood:    Anxious   Affect:    Constricted   Thought Content:   Rumination and Psychosis denies any symptoms of psychosis  Thought Form:  Coherent  Logical     Insight:    Good     Plan:   Safety Plan: Recommended that patient call 911 or go to the local ED should there be a change in any of these risk factors.   Barriers to treatment: None identified  Patient " Contracts (see media tab):  None  Substance Use: Provided encouragement towards sobriety   Continue or Discharge: Patient will continue in Adult Day Treatment (ADT)  as planned. Patient is likely to benefit from learning and using skills as they work toward the goals identified in their treatment plan.      Jimmy Morocho.MIKIE, LP  January 18, 2024

## 2024-01-18 NOTE — GROUP NOTE
Psychoeducation Group Note    PATIENT'S NAME: Jackie Kaplan  MRN:   [id number]  :   1965  ACCT. NUMBER: [id number]  DATE OF SERVICE: 24  START TIME:  2:00 PM  END TIME:  2:50 PM  FACILITATOR: Carisa Yan LICSW; Genna Patrick OTR  TOPIC: MH Life Skills Group: Resiliency Development  [facility]  TRACK: IOP/DT 2 P    NUMBER OF PARTICIPANTS: 5    Summary of Group / Topics Discussed:  Resiliency Development:  Coping Skills: Provided education on the benefits of exploring and identifying helpful coping skills to help manage distress and regulate emotions.  Discussed the importance of having a coping skills plan in times of increased symptoms.  Patients were given an opportunity to explore different types of coping skills (distraction, positive/reward, relaxation, mindfulness/grounding, safe ways to express feelings).  Patients self-reflected on coping skills that they could engage in their daily life and add into their daily routine.        Patient Session Goals / Objectives:   *Review the benefits of having different types of coping skills to help manage distress and regulate emotions.   *Explore different types of coping skills to promote self-regulation and independent skill use.   *Established a plan for practice of these skills in their own environments.       Patient Participation / Response:  Fully participated with the group by sharing personal reflections / insights and openly received / provided feedback with other participants.    Verbalized understanding of content    Treatment Plan:  Patient has a current master individualized treatment plan.  See Epic treatment plan for more information.    CIERRA Galarza

## 2024-01-18 NOTE — GROUP NOTE
Psychotherapy Group Note    PATIENT'S NAME: Jackie Kaplan  MRN:   [id number]  :   1965  ACCT. NUMBER: [id number]  DATE OF SERVICE: 24  START TIME: 12:00 PM  END TIME: 12:50 PM  FACILITATOR: Alka De La Paz Psy.D, LP  TOPIC: MH EBP Group: Specialty Awareness  [facility]  TRACK: iop/dt2p  6B    NUMBER OF PARTICIPANTS: 5    Summary of Group / Topics Discussed:  Specialty Topics: Forgiveness: Patients were provided with an overview of the topic of forgiveness including how to better understand the nature of forgiveness of others and oneself. Exploring the phases of forgiveness and how one works them was covered. Patients were able to explore how practicing forgiveness may positively impact their functioning.     Patient Session Goals / Objectives:  Discussed definitions of forgiveness and self-forgiveness  Explored the phases and process of forgiveness  Discussed benefits of forgiveness to their relationships with themselves and others, connecting the concept to mindfulness and acceptance  Assisted patients in recognizing when practicing forgiveness may be helpful      Patient Participation / Response:  Fully participated with the group by sharing personal reflections / insights and openly received / provided feedback with other participants.    Demonstrated understanding of topics discussed through group discussion and participation and Identified / Expressed readiness to act on skill suggestions discussed in topic    Treatment Plan:  Patient has a current master individualized treatment plan.  See Epic treatment plan for more information.    Alka De La Paz Psy.D, ANGUS

## 2024-01-19 ENCOUNTER — LAB REQUISITION (OUTPATIENT)
Dept: LAB | Facility: CLINIC | Age: 59
End: 2024-01-19
Payer: COMMERCIAL

## 2024-01-19 DIAGNOSIS — E03.9 HYPOTHYROIDISM, UNSPECIFIED: ICD-10-CM

## 2024-01-19 PROCEDURE — 84443 ASSAY THYROID STIM HORMONE: CPT | Mod: ORL | Performed by: STUDENT IN AN ORGANIZED HEALTH CARE EDUCATION/TRAINING PROGRAM

## 2024-01-20 LAB — TSH SERPL DL<=0.005 MIU/L-ACNC: 1.41 UIU/ML (ref 0.3–4.2)

## 2024-01-22 ENCOUNTER — HOSPITAL ENCOUNTER (OUTPATIENT)
Dept: BEHAVIORAL HEALTH | Facility: CLINIC | Age: 59
Discharge: HOME | End: 2024-01-22
Attending: PSYCHIATRY & NEUROLOGY
Payer: COMMERCIAL

## 2024-01-22 PROCEDURE — 90853 GROUP PSYCHOTHERAPY: CPT | Performed by: PSYCHOLOGIST

## 2024-01-22 PROCEDURE — 90853 GROUP PSYCHOTHERAPY: CPT | Performed by: SOCIAL WORKER

## 2024-01-22 NOTE — GROUP NOTE
"Process Group Note    PATIENT'S NAME: Jackie Kaplan  MRN:   [id number]  :   1965  ACCT. NUMBER: [id number]  DATE OF SERVICE: 24  START TIME:  1:00 PM  END TIME:  1:50 PM  FACILITATOR: Alka De La Paz Psy.D, LP  TOPIC:  Process Group    Diagnoses:  300.02 (F41.1) Generalized Anxiety Disorder.  296.32 (F33.1) Major Depressive Disorder, Recurrent Episode, Moderate _ and With anxious distress  309.9 (F43.9) Unspecified Trauma and Stressor Related Disorder.  5. 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       Per [facility]   PSYCHIATRY DISCHARGE SUMMARY  Admission Date and Time: 10/6/2023 3:11 AM   Discharge Date: 10/8/2023  Discharge Diagnoses   # 1: Bipolar I, manic with psychotic features   # 2: Cannabis use disorder, moderate     Per her report  PTSD                [facility]: [facility]        [address]  [place] [address]  Phone: [phone]          Individual Therapist/Name:  [facility] ([facility])  PCP: Mary Carbajal [facility]  TRACK: iop/dt2-  6B    NUMBER OF PARTICIPANTS: 6        Data:    Session content: At the start of this group, patients were invited to check in by identifying themselves, describing their current emotional status, and identifying issues to address in this group.   Area(s) of treatment focus addressed in this session included Symptom Management, Personal Safety, and Community Resources/Discharge Planning.  Client reported being safe today.  Reported mood is \"good.\"    Goal for today is to attend group therapy The client talked to the group about how she and her  are looking at homes to buy and she may have found one. She reported being patient with herself and the process of buying a house. She stated that she has been sober, but uses a " small amount of marijuana to get to sleep. She talked about her thyroid and dyspepsia with others. She requested a list of Al-anon groups to attend for support.      Therapeutic Interventions/Treatment Strategies:  Psychotherapist reinforced use of skills. Treatment modalities used include Cognitive Behavioral Therapy and Dialectical Behavioral Therapy. Interventions include Coping Skills: Reviewed patients current calming practices and discussed a more formal way of practicing and accessing skills, Relapse Prevention: Assisted patient in identifying personal vulnerabilities, thoughts, emotions, and situations that may lead to relapse , Mindfulness: Facilitated discussion of when/how to use mindfulness skills to benefit general health, mental health symptoms, and stressors, and Symptoms Management: Promoted understanding of their diagnoses and how it impacts their functioning.    Assessment:    Patient response:   Patient responded to session by giving feedback, listening, and accepting support    Possible barriers to participation / learning include: severity of symptoms    Health Issues:   None reported       Substance Use Review:   Substance Use: No active concerns identified.    Mental Status/Behavioral Observations  Appearance:   Appropriate   Eye Contact:   Good   Psychomotor Behavior: Normal   Attitude:   Cooperative   Orientation:   All  Speech   Rate / Production: Normal    Volume:  Normal   Mood:    Anxious   Affect:    Constricted   Thought Content:   Rumination  Thought Form:  Coherent  Logical     Insight:    Good     Plan:   Safety Plan: Recommended that patient call 911 or go to the local ED should there be a change in any of these risk factors.   Barriers to treatment: None identified  Patient Contracts (see media tab):  None  Substance Use: Provided encouragement towards sobriety   Continue or Discharge: Patient will continue in Adult Day Treatment (ADT)  as planned. Patient is likely to benefit  from learning and using skills as they work toward the goals identified in their treatment plan.      Alka De La Paz Psy.D, LP  January 22, 2024

## 2024-01-22 NOTE — GROUP NOTE
Psychotherapy Group Note    PATIENT'S NAME: Jackie Kaplan  MRN:   [id number]  :   1965  ACCT. NUMBER: [id number]  DATE OF SERVICE: 24  START TIME: 12:00 PM  END TIME: 12:50 PM  FACILITATOR: Alka De La Paz Psy.D, LP  TOPIC: MH EBP Group: Behavioral Activation  [facility]  TRACK: iop/dt2p    NUMBER OF PARTICIPANTS: 6    Summary of Group / Topics Discussed:  Behavioral Activation: Motivation and Procrastination: Patients explored how they currently spend their time, identifying thoughts and feelings that are motivating and serve to increase desired behaviors.  They also examined behaviors that contribute to procrastination.  Different types of procrastination behaviors were identified, and strategies to reduce individual procrastination and increase motivation were explored and practiced.  Patients identified ways to increase goal-directed activities to enhance mood and reduce symptoms.        Patient Session Goals / Objectives:  Identify current patterns of procrastination behavior and how they influence thoughts and moods, and inhibit motivation.  Identify behaviors that can be implemented that contribute to improving thoughts and feelings, motivation, and reduce symptoms.  Identify and develop a plan to increase activities that promote a sense of accomplishment and competence.  Practice scheduling positive activities / behaviors into daily routines.      Patient Participation / Response:  Fully participated with the group by sharing personal reflections / insights and openly received / provided feedback with other participants.    Expressed understanding of the relationship between behaviors, thoughts, and feelings    Treatment Plan:  Patient has a current master individualized treatment plan.  See Epic treatment plan for more information.    Alka De La Paz Psy.D, ANGUS

## 2024-01-22 NOTE — GROUP NOTE
Psychoeducation Group Note    PATIENT'S NAME: Jackie Kaplan  MRN:   [id number]  :   1965  ACCT. NUMBER: [id number]  DATE OF SERVICE: 24  START TIME:  2:00 PM  END TIME:  2:50 PM  FACILITATOR: Carisa Yan LICSW; Sandy Campbell RN  TOPIC:  Wellness Group: Mind/Body Practice & Complementary  [facility]  TRACK: IOP/DT 2    NUMBER OF PARTICIPANTS: 6    Summary of Group / Topics Discussed:  Mind/Body Practice & Complementary Therapies:  Relaxation Techniques: In this group, patients were educated on a variety of relaxation and mindfulness skills to reduce distress and improve coping skills. The skills were taught to the group and then practiced through an organized exercise.     Skills taught & practiced included:  Personal Relaxation Scene, Deep Breathing Exercise, Sensory Exercise,   Mindful Walking Exercise, Pollyvagal Ladder, TIPP     Patient Session Goals / Objectives:  Identified relaxation skills  Explained how the various relaxation skills are practiced  Practiced relaxation experiential       Patient Participation / Response:  Fully participated with the group by sharing personal reflections / insights and openly received / provided feedback with other participants.    Demonstrated understanding of topics discussed through group discussion and participation and Verbalized understanding of Mind/Body Practice & Complementary Therapies topic    Treatment Plan:  Patient has a current master individualized treatment plan.  See Epic treatment plan for more information.    CIERRA Galarza

## 2024-01-23 ENCOUNTER — HOSPITAL ENCOUNTER (OUTPATIENT)
Dept: BEHAVIORAL HEALTH | Facility: CLINIC | Age: 59
Discharge: HOME | End: 2024-01-23
Attending: PSYCHIATRY & NEUROLOGY
Payer: COMMERCIAL

## 2024-01-23 PROCEDURE — 90853 GROUP PSYCHOTHERAPY: CPT | Performed by: SOCIAL WORKER

## 2024-01-23 PROCEDURE — 90853 GROUP PSYCHOTHERAPY: CPT | Performed by: PSYCHOLOGIST

## 2024-01-23 NOTE — GROUP NOTE
"Process Group Note    PATIENT'S NAME: Jackie Kaplan  MRN:   [id number]  :   1965  ACCT. NUMBER: [id number]  DATE OF SERVICE: 24  START TIME:  1:00 PM  END TIME:  1:50 PM  FACILITATOR: Alka De La Paz Psy.D, LP  TOPIC:  Process Group    Diagnoses:  300.02 (F41.1) Generalized Anxiety Disorder.  296.32 (F33.1) Major Depressive Disorder, Recurrent Episode, Moderate _ and With anxious distress  309.9 (F43.9) Unspecified Trauma and Stressor Related Disorder.  5. 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       Per [facility]   PSYCHIATRY DISCHARGE SUMMARY  Admission Date and Time: 10/6/2023 3:11 AM   Discharge Date: 10/8/2023  Discharge Diagnoses   # 1: Bipolar I, manic with psychotic features   # 2: Cannabis use disorder, moderate     Per her report  PTSD                Psychiatry: [facility]        [address]  [place] [address]  Phone: [phone]          Individual Therapist/Name:  [facility] ([facility])  PCP: Mary Carbajal [facility]  TRACK: iop/dt2p  6B    NUMBER OF PARTICIPANTS: 4        Data:    Session content: At the start of this group, patients were invited to check in by identifying themselves, describing their current emotional status, and identifying issues to address in this group.   Area(s) of treatment focus addressed in this session included Symptom Management, Personal Safety, and Community Resources/Discharge Planning.  Client reported being safe today.  Reported mood is \"ok,\"    Goal for today is to attend group therapy. The client talked to the group about how she and her  won't buy a house right now and will go to [facility] for a vacation.  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 She stated that she " "is interested in attending Al-Anon meetings and talked about support groups. She stated that her thyroid medication won't change, and her stomach has been upset lately, and she feels this is related to the thyroid medication. She reported that her sleep is ok, and she was happy that the temperature was warmer. She denied psychosis.      Therapeutic Interventions/Treatment Strategies:  Psychotherapist reinforced use of skills. Treatment modalities used include Cognitive Behavioral Therapy and Dialectical Behavioral Therapy. Interventions include Coping Skills: Discussed how the use of intentional \"in the moment\" actions can help reduce distress, Relapse Prevention: Coached on skills to manage factors that contribute to relapse, Mindfulness: Encouraged a plan to use mindfulness skills in daily life, and Symptoms Management: Promoted understanding of their diagnoses and how it impacts their functioning.    Assessment:    Patient response:   Patient responded to session by giving feedback, listening, and focusing on goals    Possible barriers to participation / learning include: severity of symptoms    Health Issues:   Yes: upset stomach       Substance Use Review:   Substance Use: Last use: smokes a little marijuana to sleep and for appetite    Mental Status/Behavioral Observations  Appearance:   Appropriate   Eye Contact:   Good   Psychomotor Behavior: Normal   Attitude:   Cooperative   Orientation:   All  Speech   Rate / Production: Normal    Volume:  Normal   Mood:    Anxious   Affect:    Constricted   Thought Content:   Rumination and Psychosis denies any symptoms of psychosis  Thought Form:  Coherent  Logical     Insight:    Good     Plan:   Safety Plan: Recommended that patient call 911 or go to the local ED should there be a change in any of these risk factors.   Barriers to treatment: None identified  Patient Contracts (see media tab):  None  Substance Use: Provided encouragement towards sobriety   Continue or " Discharge: Patient will continue in Adult Day Treatment (ADT)  as planned. Patient is likely to benefit from learning and using skills as they work toward the goals identified in their treatment plan.      Alka De La Paz Psy.D, LP  January 23, 2024

## 2024-01-23 NOTE — GROUP NOTE
Psychotherapy Group Note    PATIENT'S NAME: Jackie Kalpan  MRN:   [id number]  :   1965  ACCT. NUMBER: [id number]  DATE OF SERVICE: 24  START TIME: 12:00 PM  END TIME: 12:50 PM  FACILITATOR: Alka De La Paz Psy.D, LP  TOPIC: MH EBP Group: Behavioral Activation  [facility]  TRACK: 6B iop/dt2p    NUMBER OF PARTICIPANTS: 5    Summary of Group / Topics Discussed:  Behavioral Activation: The Change Process - Behavior Change: Patients explored the process and types of change, including but not limited to, theories of change, steps to making change, methods of changing behavior, and potential barriers.  Patients worked to identify what changes may benefit their daily lives, and work towards a plan to implement change.      Patient Session Goals / Objectives:  Demonstrate understanding of the change process.    Identify positive and negative behavioral patterns.  Make plans to track and implement changes and share experiences in group.  Identify personal barriers to change      Patient Participation / Response:  Fully participated with the group by sharing personal reflections / insights and openly received / provided feedback with other participants.    Expressed understanding of the relationship between behaviors, thoughts, and feelings    Treatment Plan:  Patient has a current master individualized treatment plan.  See Epic treatment plan for more information.    Alka De La Paz Psy.D, ANGUS

## 2024-01-23 NOTE — GROUP NOTE
"Psychoeducation Group Note    PATIENT'S NAME: Jackie Kaplan  MRN:   [id number]  :   1965  ACCT. NUMBER: [id number]  DATE OF SERVICE: 24  START TIME:  2:00 PM  END TIME:  2:50 PM  FACILITATOR: Carisa Yan LICSW; Genna Patrick OTR  TOPIC: MH Life Skills Group: Resiliency Development  [facility]  TRACK: IOP/DT 2    NUMBER OF PARTICIPANTS: 3    Summary of Group / Topics Discussed:  Resiliency Development: Occupational Gifts: Patients were given the opportunity to reflect on and identify different types of occupations (activities) they participate in to maintain and/or build resilience in their lives.  Patients were taught about how \"doing\" promotes mental health recovery by providing routine and structure, empowerment, sense of self, and connectedness.  Patients identified occupations (activities) that promote mental health: connection, centering, creation, contemplation, and contribution.  Patients were also given the opportunity to improve self-efficacy, self-sufficiency, quality of life and a sense of mastery and competency by identifying and exploring positive activities they participate in their life. Validation, support, and feedback were provided from staff throughout group.       Patient Session Goals / Objectives:   Identified occupations (activities) they can use to promote mental health recovery and to effectively manage mental health symptoms .   Improved awareness of positive qualities of occupations they currently participate in and new occupations they might try and how this contributes to the process of recovery and mental health wellbeing and resiliency.   Established a plan for practice of these skills in their own environments.   Practiced and reflected on how to generalize taught skills to their everyday life.       Patient Participation / Response:  Fully participated with the group by sharing personal reflections / insights " and openly received / provided feedback with other participants.    Verbalized understanding of content    Treatment Plan:  Patient has a current master individualized treatment plan.  See Epic treatment plan for more information.    Carisa Yan, ANEESHSW

## 2024-01-25 ENCOUNTER — TELEPHONE (OUTPATIENT)
Dept: BEHAVIORAL HEALTH | Facility: CLINIC | Age: 59
End: 2024-01-25
Payer: COMMERCIAL

## 2024-01-25 ENCOUNTER — HOSPITAL ENCOUNTER (OUTPATIENT)
Dept: BEHAVIORAL HEALTH | Facility: CLINIC | Age: 59
Discharge: HOME | End: 2024-01-25
Attending: PSYCHIATRY & NEUROLOGY
Payer: COMMERCIAL

## 2024-01-25 PROCEDURE — 90853 GROUP PSYCHOTHERAPY: CPT | Performed by: SOCIAL WORKER

## 2024-01-25 PROCEDURE — 90853 GROUP PSYCHOTHERAPY: CPT | Performed by: PSYCHOLOGIST

## 2024-01-25 ASSESSMENT — COLUMBIA-SUICIDE SEVERITY RATING SCALE - C-SSRS
TOTAL  NUMBER OF ABORTED OR SELF INTERRUPTED ATTEMPTS SINCE LAST CONTACT: NO
6. HAVE YOU EVER DONE ANYTHING, STARTED TO DO ANYTHING, OR PREPARED TO DO ANYTHING TO END YOUR LIFE?: NO
2. HAVE YOU ACTUALLY HAD ANY THOUGHTS OF KILLING YOURSELF?: NO
SUICIDE, SINCE LAST CONTACT: NO
TOTAL  NUMBER OF INTERRUPTED ATTEMPTS SINCE LAST CONTACT: NO
1. SINCE LAST CONTACT, HAVE YOU WISHED YOU WERE DEAD OR WISHED YOU COULD GO TO SLEEP AND NOT WAKE UP?: NO
ATTEMPT SINCE LAST CONTACT: NO

## 2024-01-25 NOTE — GROUP NOTE
Psychotherapy Group Note    PATIENT'S NAME: Jackie Kaplan  MRN:   [id number]  :   1965  ACCT. NUMBER: [id number]  DATE OF SERVICE: 24  START TIME: 12:00 PM  END TIME: 12:50 PM  FACILITATOR: Alka De La Paz Psy.D, LP  TOPIC: MH EBP Group: Specialty Awareness  [facility] Outpatient Programs  TRACK: iop/dt2p  6B    NUMBER OF PARTICIPANTS: 6    Summary of Group / Topics Discussed:  Specialty Topics: Forgiveness: Patients were provided with an overview of the topic of forgiveness including how to better understand the nature of forgiveness of others and oneself. Exploring the phases of forgiveness and how one works them was covered. Patients were able to explore how practicing forgiveness may positively impact their functioning.     Patient Session Goals / Objectives:  Discussed definitions of forgiveness and self-forgiveness  Explored the phases and process of forgiveness  Discussed benefits of forgiveness to their relationships with themselves and others, connecting the concept to mindfulness and acceptance  Assisted patients in recognizing when practicing forgiveness may be helpful      Patient Participation / Response:  Fully participated with the group by sharing personal reflections / insights and openly received / provided feedback with other participants.    Identified / Expressed readiness to act on skill suggestions discussed in topic    Treatment Plan:  Patient has a current master individualized treatment plan.  See Epic treatment plan for more information.    Alka De La Paz Psy.D, ANGUS

## 2024-01-25 NOTE — GROUP NOTE
Psychoeducation Group Note    PATIENT'S NAME: Jackie Kaplan  MRN:   [id number]  :   1965  ACCT. NUMBER: [id number]  DATE OF SERVICE: 24  START TIME:  2:00 PM  END TIME:  2:50 PM  FACILITATOR: Carisa Yan LICSW; Genna Patrick OTR  TOPIC: MH Life Skills Group: Resiliency Development  [facility] Adult [facility] Outpatient [facility]  TRACK: IOP/DT 2    NUMBER OF PARTICIPANTS: 7    Summary of Group / Topics Discussed:  Resiliency Development: Occupational Gifts Experiential: Patients were given the opportunity to engage in an activity that promotes resilience, connection, and empowerment throughout their daily life. Patients independently identified an activity that will contribute to their mental health recovery and promote self-efficacy and a sense of mastery. Patients discussed with peers the impact of the activity on their stress and anxiety levels. Validation, support, and guidance were provided throughout group.      Patient Session Goals / Objectives:   Established a plan for practice of these skills in their own environments.   Practiced and reflected on how to generalize taught skills to their everyday life.   Independently engage in an occupation that promotes mental health recovery.       Patient Participation / Response:  Fully participated with the group by sharing personal reflections / insights and openly received / provided feedback with other participants.    Verbalized understanding of content    Treatment Plan:  Patient has See Epic Treatment Plan - Patient is discharging.    CIERRA Galarza

## 2024-01-25 NOTE — GROUP NOTE
Psychotherapy Group Note    PATIENT'S NAME: Jackie Kaplan  MRN:   [id number]  :   1965  ACCT. NUMBER: [id number]  DATE OF SERVICE: 24  START TIME:  1:00 PM  END TIME:  1:50 PM  FACILITATOR: Alka De La Paz Psy.D, LP  TOPIC: MH EBP Group: Specialty Awareness  [facility] Adult Mental Health Outpatient Programs  TRACK: iop/dt2p  6B    NUMBER OF PARTICIPANTS: 6    Summary of Group / Topics Discussed:  Specialty Topics: Forgiveness: Patients were provided with an overview of the topic of forgiveness including how to better understand the nature of forgiveness of others and oneself. Exploring the phases of forgiveness and how one works them was covered. Patients were able to explore how practicing forgiveness may positively impact their functioning.     Patient Session Goals / Objectives:  Discussed definitions of forgiveness and self-forgiveness  Explored the phases and process of forgiveness  Discussed benefits of forgiveness to their relationships with themselves and others, connecting the concept to mindfulness and acceptance  Assisted patients in recognizing when practicing forgiveness may be helpful      Patient Participation / Response:  Fully participated with the group by sharing personal reflections / insights and openly received / provided feedback with other participants.    Identified / Expressed readiness to act on skill suggestions discussed in topic    Treatment Plan:  Patient has a current master individualized treatment plan.  See Epic treatment plan for more information.    Alka De La Paz Psy.D, ANGUS

## 2024-01-25 NOTE — TELEPHONE ENCOUNTER
----- Message from Alka De La Paz Psy.D, LP sent at 1/25/2024  4:23 PM CST -----  Please remove from the [facility]    Iop/dt2p  or 6B    Location of programming: in-person outpatient mental health    Start Date: 10/24/23  Group:   iop/dt 2p             6B, meets M,T,Th from noon - 3 pm   Provider: Mirella  Number of visits to be scheduled: ,....... DISCHARGED  Length/Duration of Appointment in minutes: 180   Visit Type in-person   Additional notes:       Thanks  From  Alka De La Paz Psrema, D,  Licensed Clinical Psychologist  [facility]

## 2024-01-25 NOTE — ADDENDUM NOTE
Encounter addended by: Alka De La Paz Psy.D, ANGUS on: 1/25/2024 4:27 PM   Actions taken: Episode resolved

## 2024-05-04 ENCOUNTER — HEALTH MAINTENANCE LETTER (OUTPATIENT)
Age: 59
End: 2024-05-04

## 2024-07-31 ENCOUNTER — LAB REQUISITION (OUTPATIENT)
Dept: LAB | Facility: CLINIC | Age: 59
End: 2024-07-31
Payer: COMMERCIAL

## 2024-07-31 DIAGNOSIS — E03.9 HYPOTHYROIDISM, UNSPECIFIED: ICD-10-CM

## 2024-07-31 PROCEDURE — 84443 ASSAY THYROID STIM HORMONE: CPT | Mod: ORL | Performed by: FAMILY MEDICINE

## 2024-08-01 LAB — TSH SERPL DL<=0.005 MIU/L-ACNC: 2.75 UIU/ML (ref 0.3–4.2)

## 2024-08-13 ENCOUNTER — TELEPHONE (OUTPATIENT)
Dept: BEHAVIORAL HEALTH | Facility: CLINIC | Age: 59
End: 2024-08-13
Payer: COMMERCIAL

## 2024-08-13 NOTE — TELEPHONE ENCOUNTER
"Pt is a(n) adult (16+ out of HS) Seeking as eval for Adult Mental Health DA for Programmatic Care - Program Preference? No.  Appointment scheduled by:  Other  (no cost estimate)  Caller name:  Jonathon Boyer    Caller phone #: [phone]    Legal Guardianship Reviewed?  No  Honoring Choices Notified?  No       needed?  NO    Contact information verified/updated: Yes    If appt is for adult [facility] program location, confirm you have verified the location and address with the patient referring to the template header.  Yes    Debo Celis    \"We have scheduled your evaluation. In the event that your insurance coverage comes back as out of network, you may receive a call to cancel your appointment and direct you to your insurance company for in-network coverage.\"    Disclaimer regarding insurance read to patient?  Yes  Informed patient [place] are for programming that is in person in the [place] area?  Yes - proceed with scheduling      "

## 2025-01-12 ENCOUNTER — HEALTH MAINTENANCE LETTER (OUTPATIENT)
Age: 60
End: 2025-01-12

## 2025-01-15 ENCOUNTER — LAB REQUISITION (OUTPATIENT)
Dept: LAB | Facility: CLINIC | Age: 60
End: 2025-01-15
Payer: COMMERCIAL

## 2025-01-15 DIAGNOSIS — E03.9 HYPOTHYROIDISM, UNSPECIFIED: ICD-10-CM

## 2025-01-15 LAB
ANION GAP SERPL CALCULATED.3IONS-SCNC: 9 MMOL/L (ref 7–15)
BUN SERPL-MCNC: 14 MG/DL (ref 8–23)
CALCIUM SERPL-MCNC: 9.8 MG/DL (ref 8.8–10.4)
CHLORIDE SERPL-SCNC: 104 MMOL/L (ref 98–107)
CREAT SERPL-MCNC: 0.84 MG/DL (ref 0.51–0.95)
EGFRCR SERPLBLD CKD-EPI 2021: 79 ML/MIN/1.73M2
GLUCOSE SERPL-MCNC: 88 MG/DL (ref 70–99)
HCO3 SERPL-SCNC: 25 MMOL/L (ref 22–29)
POTASSIUM SERPL-SCNC: 4.2 MMOL/L (ref 3.4–5.3)
SODIUM SERPL-SCNC: 138 MMOL/L (ref 135–145)
TSH SERPL DL<=0.005 MIU/L-ACNC: 0.99 UIU/ML (ref 0.3–4.2)

## 2025-01-15 PROCEDURE — 80048 BASIC METABOLIC PNL TOTAL CA: CPT | Mod: ORL | Performed by: FAMILY MEDICINE

## 2025-01-15 PROCEDURE — 84443 ASSAY THYROID STIM HORMONE: CPT | Mod: ORL | Performed by: FAMILY MEDICINE

## 2025-09-02 ENCOUNTER — LAB REQUISITION (OUTPATIENT)
Dept: LAB | Facility: CLINIC | Age: 60
End: 2025-09-02
Payer: COMMERCIAL

## 2025-09-02 DIAGNOSIS — E03.9 HYPOTHYROIDISM, UNSPECIFIED: ICD-10-CM

## 2025-09-03 LAB
T4 FREE SERPL-MCNC: 1.5 NG/DL (ref 0.9–1.7)
TSH SERPL DL<=0.005 MIU/L-ACNC: 5.77 UIU/ML (ref 0.3–4.2)